# Patient Record
Sex: MALE | Race: WHITE
[De-identification: names, ages, dates, MRNs, and addresses within clinical notes are randomized per-mention and may not be internally consistent; named-entity substitution may affect disease eponyms.]

---

## 2017-04-17 ENCOUNTER — HOSPITAL ENCOUNTER (OUTPATIENT)
Dept: HOSPITAL 47 - RADCTMAIN | Age: 52
Discharge: HOME | End: 2017-04-17
Attending: NURSE PRACTITIONER
Payer: COMMERCIAL

## 2017-04-17 DIAGNOSIS — K80.20: ICD-10-CM

## 2017-04-17 DIAGNOSIS — K66.8: ICD-10-CM

## 2017-04-17 DIAGNOSIS — Z01.818: Primary | ICD-10-CM

## 2017-04-17 PROCEDURE — 74176 CT ABD & PELVIS W/O CONTRAST: CPT

## 2017-04-17 NOTE — CT
EXAMINATION TYPE: CT abdomen pelvis wo con

 

DATE OF EXAM: 4/17/2017 9:03 AM

 

COMPARISON: 2/26/2016

 

INDICATION: Screening for third kidney transplant

 

DLP: 1090 mGycm, Automated exposure control for dose reduction was used.

 

CONTRAST:  0 mL of Omnipaque 300. 

                        Study performed with Oral Contrast

 

TECHNIQUE: Axial images were obtained from above the diaphragm to the pubic rami in the axial plane a
t 5 mm thick sections.  Reconstructed images are reviewed on the computer in the coronal plane.  

 

FINDINGS:

 

Limited CT sections are obtained the lung bases.  There is pneumonitis type changes at the left lung 
base. This is a change from comparison..  Previous pleural effusions have resolved.

 

CT ABDOMEN:

 

Liver: There is a 1.2 x 3.0 cm calcified mass anterior to the right lobe of liver. This was present p
reviously and appears stable. No suspicious intraparenchymal abnormalities evident.

 

Spleen: Spleen appears enlarged measuring 17.7 cm in craniocaudal dimension.

 

Pancreas: Normal

 

Adrenal glands: The adrenal glands are normal.

 

Gallbladder: Punctate gallstone is at the neck of the gallbladder.  

 

Kidneys: Left kidney is atrophic. Right kidney is absent. There is a transplant kidney within the lef
t hemipelvis. This contains a 1.4 cm cyst measuring approximately 16 Hounsfield units. 0.4 cm calcifi
cation is within the hilum. 0.2 and 0.3 cm calcification is within the mid transplant kidney. These c
ould be related to vascular calcifications or nonobstructing renal stones. Reconstructed coronal plan
e images these appear more related to vascular calcification. Some additional vascular calcification 
is at the splenic hilum. Perinephric stranding is present.

 

Aorta: Vascular calcification is within the aorta.  Vascular calcifications within the iliac vessels.


 

Inferior vena cava: Normal.

 

CT PELVIS: 

Sigmoid colon is somewhat thickened within its proximal portion. Correlate for colitis. Couple of div
erticuli within the distal sigmoid colon. Small bowel loops distended with oral contrast are normal. 
Some mild wall thickening of the proximal ascending colon may be present. Transverse colon with contr
ast appears unremarkable.

 

There is a large cystic structure within the mesentery measuring 10.0 x 10.7 centimeters. This was pr
esent previously and may be minimally diminished in size. Previous ascites has essentially resolved .
 There are loops of bowel which are incompletely distended or lack oral contrast limiting their evalu
ation.

 

Appendix: Not identified

 

Urinary bladder: Decompressed with thickened wall can related to incomplete distention. 

 

Genitourinary structures: Prostate appears within normal limits.

 

Osseous structures: No suspicious lytic or sclerotic lesions. Spondylolysis of bowel for may be prese
nt. Facet degenerative changes are present L4-5 L5-S1. Degenerative disc changes are present L3-4.

 

IMPRESSIONS:

1.  Transplant kidney left hemipelvis with perinephric stranding. Vascular calcification appears to b
e present without hydronephrosis.

2. Mesenteric cyst.

3. Resolution of previous ascites and pleural effusions.

4. Correlate for sigmoid colitis proximal portion of the sigmoid colon.

5. Punctate gallstone

## 2017-04-26 ENCOUNTER — HOSPITAL ENCOUNTER (OUTPATIENT)
Dept: HOSPITAL 47 - ORWHC2ENDO | Age: 52
Discharge: HOME | End: 2017-04-26
Payer: MEDICARE

## 2017-04-26 VITALS — DIASTOLIC BLOOD PRESSURE: 53 MMHG | HEART RATE: 73 BPM | SYSTOLIC BLOOD PRESSURE: 91 MMHG

## 2017-04-26 VITALS — RESPIRATION RATE: 16 BRPM

## 2017-04-26 VITALS — BODY MASS INDEX: 25 KG/M2

## 2017-04-26 VITALS — TEMPERATURE: 97.3 F

## 2017-04-26 DIAGNOSIS — Z87.891: ICD-10-CM

## 2017-04-26 DIAGNOSIS — Z01.818: Primary | ICD-10-CM

## 2017-04-26 DIAGNOSIS — K57.30: ICD-10-CM

## 2017-04-26 DIAGNOSIS — Z79.52: ICD-10-CM

## 2017-04-26 DIAGNOSIS — K21.9: ICD-10-CM

## 2017-04-26 DIAGNOSIS — Z79.4: ICD-10-CM

## 2017-04-26 DIAGNOSIS — Z79.899: ICD-10-CM

## 2017-04-26 DIAGNOSIS — E11.9: ICD-10-CM

## 2017-04-26 DIAGNOSIS — D12.4: ICD-10-CM

## 2017-04-26 DIAGNOSIS — K63.89: ICD-10-CM

## 2017-04-26 DIAGNOSIS — N28.9: ICD-10-CM

## 2017-04-26 LAB
GLUCOSE BLD-MCNC: 134 MG/DL (ref 75–99)
GLUCOSE BLD-MCNC: 141 MG/DL (ref 75–99)

## 2017-04-26 PROCEDURE — 88305 TISSUE EXAM BY PATHOLOGIST: CPT

## 2017-04-26 PROCEDURE — 45380 COLONOSCOPY AND BIOPSY: CPT

## 2017-04-26 NOTE — P.PCN
Date of Procedure: 04/26/17


Procedure(s) Performed: 


BRIEF HISTORY: Patient is a 51-year-old pleasant white male, scheduled for an 

elective colonoscopy as a part of pretransplant kidney evaluation.  He also has 

been complaining of change in bowel habits.  He is asked coloscopy was done in 

2015 and was noted to have severe medication-induced colitis.  Presently the 

diarrhea has resolved however he has irregular bowel movements.





PROCEDURE PERFORMED: Colonoscopy with biopsy. 





PREOPERATIVE DIAGNOSIS: Change in bowel habits and prekidney transplant 

evaluation. 





IV sedation per Anesthesia. 





PROCEDURE: After informed consent was obtained, the patient, was brought into 

the endoscopy unit. IV sedation was administered by Anesthesia under continuous 

monitoring.  Digital rectal examination was normal. Initially the Olympus CF-

160 flexible video colonoscope was then inserted in the rectum, gradually 

advanced into the cecum without any difficulty. Careful examination was 

performed as the scope was gradually being withdrawn. Ileocecal valve and the 

appendiceal orifice were visualized and appeared normal.  Prep was excellent. 

Mucosa of the cecum, ascending colon, transverse colon, appeared normal.  In 

the descending colon there was a polyp that was removed by biopsy.  The rest of 

the descending colon, sigmoid colon, and rectum appeared normal.  Random 

biopsies were done from the ascending and descending colon to rule out colitis.

  Scattered sigmoid diverticulosis.  Retroflexion was performed in the rectum 

and no lesions were seen. The patient tolerated the procedure well. 





IMPRESSION: 


5 mm ascending colon polyp status post removal by biopsy.


Rest of the colon appeared normal.


Scattered sigmoid diverticulosis





RECOMMENDATIONS:  Findings of this examination were discussed with the patient 

as well as his family.  He was advised to follow with the biopsy results.  He 

can have a repeat colonoscopy in 5 years, based on the biopsy results.

## 2018-10-01 ENCOUNTER — HOSPITAL ENCOUNTER (OUTPATIENT)
Dept: HOSPITAL 47 - RADUSWWP | Age: 53
Discharge: HOME | End: 2018-10-01
Payer: MEDICARE

## 2018-10-01 DIAGNOSIS — T86.19: Primary | ICD-10-CM

## 2018-10-01 PROCEDURE — 76770 US EXAM ABDO BACK WALL COMP: CPT

## 2018-10-01 NOTE — US
EXAMINATION TYPE: US kidneys/renal and bladder

 

DATE OF EXAM: 10/1/2018

 

COMPARISON: CT 2017, US 2016

 

CLINICAL HISTORY: Hematuria R31.0. Hematuria, native right left kidneys removed, transplant kidney le
ft pelvis, patient states he does not make urine, patient on dialysis.

 

EXAM MEASUREMENTS:

 

Right Kidney:  removed 

Left Kidney: removed 

Transplant Kidney: 9.2 x 7.6 x 7.9cm

 

 

 

Right Kidney: removed  

Left Kidney: removed 

Transplant Kidney: left pelvis, 1.8cm cystic area superior pole

Bladder: not distended

**Bilateral Jets seen: no

 

**Spleen: enlarged at 17.3cm

**10.3 x 7.9 x 10.3cm cystic structure with internal echoes seen in LLQ medial to transplant kidney.

 

 

 

 

 

IMPRESSION:

 

1. Transplanted kidney appears to be unremarkable. Cystic structure is noted upper pole.

2. Cystic structure noted medial to the transplanted kidney with dimensions given above.

3. Splenomegaly.

## 2024-09-24 ENCOUNTER — HOSPITAL ENCOUNTER (INPATIENT)
Dept: HOSPITAL 47 - EC | Age: 59
LOS: 5 days | Discharge: HOME | DRG: 388 | End: 2024-09-29
Attending: INTERNAL MEDICINE | Admitting: INTERNAL MEDICINE
Payer: MEDICARE

## 2024-09-24 DIAGNOSIS — I08.0: ICD-10-CM

## 2024-09-24 DIAGNOSIS — B19.10: ICD-10-CM

## 2024-09-24 DIAGNOSIS — E11.40: ICD-10-CM

## 2024-09-24 DIAGNOSIS — Z99.2: ICD-10-CM

## 2024-09-24 DIAGNOSIS — Z79.51: ICD-10-CM

## 2024-09-24 DIAGNOSIS — I47.10: ICD-10-CM

## 2024-09-24 DIAGNOSIS — I72.3: ICD-10-CM

## 2024-09-24 DIAGNOSIS — Z79.899: ICD-10-CM

## 2024-09-24 DIAGNOSIS — Z95.5: ICD-10-CM

## 2024-09-24 DIAGNOSIS — Z95.820: ICD-10-CM

## 2024-09-24 DIAGNOSIS — J91.8: ICD-10-CM

## 2024-09-24 DIAGNOSIS — I95.89: ICD-10-CM

## 2024-09-24 DIAGNOSIS — E11.22: ICD-10-CM

## 2024-09-24 DIAGNOSIS — K56.7: ICD-10-CM

## 2024-09-24 DIAGNOSIS — N18.6: ICD-10-CM

## 2024-09-24 DIAGNOSIS — Z79.82: ICD-10-CM

## 2024-09-24 DIAGNOSIS — J44.9: ICD-10-CM

## 2024-09-24 DIAGNOSIS — T86.12: ICD-10-CM

## 2024-09-24 DIAGNOSIS — E78.5: ICD-10-CM

## 2024-09-24 DIAGNOSIS — K21.9: ICD-10-CM

## 2024-09-24 DIAGNOSIS — E11.51: ICD-10-CM

## 2024-09-24 DIAGNOSIS — M89.8X9: ICD-10-CM

## 2024-09-24 DIAGNOSIS — K56.609: Primary | ICD-10-CM

## 2024-09-24 DIAGNOSIS — Z87.891: ICD-10-CM

## 2024-09-24 DIAGNOSIS — I12.0: ICD-10-CM

## 2024-09-24 DIAGNOSIS — I25.10: ICD-10-CM

## 2024-09-24 DIAGNOSIS — E87.5: ICD-10-CM

## 2024-09-24 DIAGNOSIS — Z90.5: ICD-10-CM

## 2024-09-24 LAB
ALBUMIN SERPL-MCNC: 5.1 G/DL (ref 3.5–5)
ALP SERPL-CCNC: 121 U/L (ref 38–126)
ALT SERPL-CCNC: 14 U/L (ref 4–49)
AMYLASE SERPL-CCNC: 78 U/L (ref 30–110)
ANION GAP SERPL CALC-SCNC: 16 MMOL/L
AST SERPL-CCNC: 19 U/L (ref 17–59)
BASOPHILS # BLD AUTO: 0 K/UL (ref 0–0.2)
BASOPHILS NFR BLD AUTO: 0 %
BUN SERPL-SCNC: 37 MG/DL (ref 9–20)
CALCIUM SPEC-MCNC: 10.7 MG/DL (ref 8.4–10.2)
CHLORIDE SERPL-SCNC: 89 MMOL/L (ref 98–107)
CO2 SERPL-SCNC: 33 MMOL/L (ref 22–30)
EOSINOPHIL # BLD AUTO: 0.1 K/UL (ref 0–0.7)
EOSINOPHIL NFR BLD AUTO: 1 %
ERYTHROCYTE [DISTWIDTH] IN BLOOD BY AUTOMATED COUNT: 4.82 M/UL (ref 4.3–5.9)
ERYTHROCYTE [DISTWIDTH] IN BLOOD: 14.5 % (ref 11.5–15.5)
GLUCOSE SERPL-MCNC: 180 MG/DL (ref 74–99)
HCT VFR BLD AUTO: 45.1 % (ref 39–53)
HGB BLD-MCNC: 14.5 GM/DL (ref 13–17.5)
LIPASE SERPL-CCNC: 187 U/L (ref 23–300)
LYMPHOCYTES # SPEC AUTO: 0.5 K/UL (ref 1–4.8)
LYMPHOCYTES NFR SPEC AUTO: 9 %
MCH RBC QN AUTO: 30.2 PG (ref 25–35)
MCHC RBC AUTO-ENTMCNC: 32.2 G/DL (ref 31–37)
MCV RBC AUTO: 93.6 FL (ref 80–100)
MONOCYTES # BLD AUTO: 0.3 K/UL (ref 0–1)
MONOCYTES NFR BLD AUTO: 5 %
NEUTROPHILS # BLD AUTO: 4.3 K/UL (ref 1.3–7.7)
NEUTROPHILS NFR BLD AUTO: 83 %
PLATELET # BLD AUTO: 203 K/UL (ref 150–450)
POTASSIUM SERPL-SCNC: 5.7 MMOL/L (ref 3.5–5.1)
PROT SERPL-MCNC: 8.8 G/DL (ref 6.3–8.2)
SODIUM SERPL-SCNC: 138 MMOL/L (ref 137–145)
WBC # BLD AUTO: 5.2 K/UL (ref 3.8–10.6)

## 2024-09-24 PROCEDURE — 83735 ASSAY OF MAGNESIUM: CPT

## 2024-09-24 PROCEDURE — 74250 X-RAY XM SM INT 1CNTRST STD: CPT

## 2024-09-24 PROCEDURE — 84132 ASSAY OF SERUM POTASSIUM: CPT

## 2024-09-24 PROCEDURE — 80053 COMPREHEN METABOLIC PANEL: CPT

## 2024-09-24 PROCEDURE — 74019 RADEX ABDOMEN 2 VIEWS: CPT

## 2024-09-24 PROCEDURE — 82150 ASSAY OF AMYLASE: CPT

## 2024-09-24 PROCEDURE — 36415 COLL VENOUS BLD VENIPUNCTURE: CPT

## 2024-09-24 PROCEDURE — 80048 BASIC METABOLIC PNL TOTAL CA: CPT

## 2024-09-24 PROCEDURE — 94640 AIRWAY INHALATION TREATMENT: CPT

## 2024-09-24 PROCEDURE — 96361 HYDRATE IV INFUSION ADD-ON: CPT

## 2024-09-24 PROCEDURE — 96374 THER/PROPH/DIAG INJ IV PUSH: CPT

## 2024-09-24 PROCEDURE — 85025 COMPLETE CBC W/AUTO DIFF WBC: CPT

## 2024-09-24 PROCEDURE — 90935 HEMODIALYSIS ONE EVALUATION: CPT

## 2024-09-24 PROCEDURE — 99285 EMERGENCY DEPT VISIT HI MDM: CPT

## 2024-09-24 PROCEDURE — 96375 TX/PRO/DX INJ NEW DRUG ADDON: CPT

## 2024-09-24 PROCEDURE — 83605 ASSAY OF LACTIC ACID: CPT

## 2024-09-24 PROCEDURE — 83690 ASSAY OF LIPASE: CPT

## 2024-09-24 PROCEDURE — 74176 CT ABD & PELVIS W/O CONTRAST: CPT

## 2024-09-24 PROCEDURE — 93306 TTE W/DOPPLER COMPLETE: CPT

## 2024-09-24 RX ADMIN — MORPHINE SULFATE STA MG: 4 INJECTION, SOLUTION INTRAMUSCULAR; INTRAVENOUS at 16:45

## 2024-09-24 RX ADMIN — ACETAMINOPHEN PRN MG: 325 TABLET, FILM COATED ORAL at 21:09

## 2024-09-24 RX ADMIN — NICARDIPINE HYDROCHLORIDE STA MLS/HR: 2.5 INJECTION INTRAVENOUS at 18:31

## 2024-09-24 RX ADMIN — PRAVASTATIN SODIUM SCH MG: 40 TABLET ORAL at 21:09

## 2024-09-24 RX ADMIN — ISODIUM CHLORIDE SCH MG: 0.03 SOLUTION RESPIRATORY (INHALATION) at 20:27

## 2024-09-24 RX ADMIN — CEFAZOLIN STA MLS/HR: 330 INJECTION, POWDER, FOR SOLUTION INTRAMUSCULAR; INTRAVENOUS at 15:43

## 2024-09-24 RX ADMIN — METOCLOPRAMIDE STA MG: 5 INJECTION, SOLUTION INTRAMUSCULAR; INTRAVENOUS at 18:32

## 2024-09-24 NOTE — CT
EXAMINATION TYPE: CT abdomen pelvis wo con

 

DATE OF EXAM: 9/24/2024

 

COMPARISON: 4/17/20172

 

INDICATION: ab pain, nausea and vomiting

 

DLP: 569.5 mGycm, Automated exposure control for dose reduction was used.

 

CONTRAST:  0 mL of Isovue 300. 

                        Study performed without Oral Contrast

 

TECHNIQUE: Axial images were obtained from above the diaphragm to the pubic rami in the axial plane a
t 5 mm thick sections.  Reconstructed images are reviewed on the computer in the coronal plane.  

 

FINDINGS:

 

Limited CT sections are obtained the lung bases.  There is a loculated effusion in the posterior late
ral left lung base. This has adjacent pleural calcification. This is incompletely evaluated. Minimal 
atelectasis posterior right lung base.

 

.

 

CT ABDOMEN: There is calcification lateral to the right hepatic border. This was present previously a
nd currently measures 3.4 x 2.2 cm. Previous measurement 3.0 x 1.2 cm. There is a calcified collectio
n in the mid abdomen

 

Liver: Normal

 

Spleen: Normal

 

Pancreas: Normal

 

Adrenal glands: The adrenal glands are normal.

 

Gallbladder: Normal  

 

Kidneys: Left kidney severely atrophic. Right kidney not identified. There is a left hemipelvis pancr
eas is may be a transplant kidney. There are multiple nonobstructing renal stones within this transpl
anted kidney. No hydronephrosis is evident.. 

 

Aorta: Vascular calcification is within the aorta.  There is some fusiform prominence of ther proxima
l common right iliac artery measuring 3.3 cm in diameter.

 

Inferior vena cava: Normal.

 

CT PELVIS: Femorofemoral bypass graft is evident. There is a fluid collection in the left inguinal re
gion which is adjacent to the femoral bypass. Hematoma or seroma could be considered. Correlate for i
nfection. Abscess is considered less likely. Racquetball: Scattered diverticuli to the sigmoid colon.
 Small bowel loops within the midabdomen have some fecal debris. These loops are also dilated and has
 some fluid present. The obstruction however is not identified.

 

Appendix: Not identified. No dilated tubular structure or inflammatory changes are evident.

 

Urinary bladder: Decompressed and cannot be evaluated. 

 

Genitourinary structures: Minimal prominence of the prostate

 

Osseous structures: No suspicious lytic or sclerotic lesions. Degenerative changes within the lumbar 
spine.

 

IMPRESSION:

1.  There are several collections with calcified walls including a fluid collection within the left l
kaushik base, intermediate calcified rim collection within the mid pelvis. A calcified collection lateral
 to the liver which was present previously. There appears to be progression of the wall calcification
s.

2. Suspected transplant kidney without hydronephrosis left hemipelvis. Multiple nonobstructing renal 
stones are present.

3. Nonspecific prominent small bowel loops with fluid and some fecal debris. Correlate for ileus and 
partial small bowel obstruction. Zone of transition is not identified.

 

[facility], Workstation: [id number], 9/24/2024 5:50 PM

## 2024-09-24 NOTE — ED
Nausea/Vomiting/Diarrhea HPI





- General


Source: patient, RN notes reviewed


Mode of arrival: ambulatory


Limitations: no limitations





<Latanya Nicolas - Last Filed: 09/24/24 13:32>





<Holli Latham - Last Filed: 09/24/24 20:04>





- General


Stated complaint: Abdominal Pain/Vomiting


Time Seen by Provider: 09/24/24 13:32





- History of Present Illness


Initial comments: 


Quick note: 59-year-old male presented to the ER with a chief complaint of 

nausea and vomiting.  Patient has kidney disease and attends dialysis Monday Wednesday Friday.  He states he was not feeling well yesterday and had his last 

treatment today.  He also is reporting abdominal pain.  He has been having the 

symptoms for the past 2 to 3 days.  Denies any fevers, chills, diarrhea or 

history of bowel surgeries.  Patient was in urgent care yesterday and received 

Zofran. (Latanya Nicolas)


59-year-old male with a history of end-stage renal disease on hemodialysis 

Monday, Wednesday, Friday presents emergency department chief complaint of 

nausea and vomiting and abdominal pain.  Last hemodialysis treatment was today. 

He states his symptoms have been present for the past 2 to 3 days.  He denies 

fevers, chills, hematochezia, dark or tarry stools.  Patient states he does not 

produce urine.  Patient was evaluated by urgent care yesterday and was 

prescribed Zofran and discharged home. (Holli Latham)





- Related Data


                                Home Medications











 Medication  Instructions  Recorded  Confirmed


 


Aspirin 81 mg PO DAILY 04/24/17 09/24/24


 


Omeprazole 20 mg PO DAILY 04/24/17 09/24/24


 


Albuterol Inhaler [Ventolin Hfa 2 puff INHALATION RT-Q4H PRN 09/24/24 09/24/24





Inhaler]   


 


Albuterol Nebulized [Ventolin 2.5 mg INHALATION RT-QID 09/24/24 09/24/24





Nebulized]   


 


Calcium Acetate 668mg 2,004 mg PO TID-W/MEALS 09/24/24 09/24/24


 


Cinacalcet HCl [Sensipar] 60 mg PO W/SUPPER 09/24/24 09/24/24


 


Ondansetron Odt [Zofran Odt] 4 mg PO TID PRN 09/24/24 09/24/24


 


Pravastatin Sodium [Pravachol] 40 mg PO HS 09/24/24 09/24/24


 


Tenofovir Disoproxil Fumarate 300 mg PO FR 09/24/24 09/24/24





[Viread]   


 


Tiotropium 2.5 Mcg/Puff [Spiriva 2 puff INHALATION RT-DAILY 09/24/24 09/24/24





Respimat 2.5 Mcg]   


 


Vit B Comp No.3/Folic/C/Biotin 1 tab PO DAILY 09/24/24 09/24/24





[Bhumika-Rene Rx Tablet]   


 


clonazePAM [KlonoPIN ODT] 0.25 mg PO HS PRN 09/24/24 09/24/24











                                    Allergies











Allergy/AdvReac Type Severity Reaction Status Date / Time


 


No Known Allergies Allergy   Verified 09/24/24 15:39














Review of Systems


ROS Other: All systems not noted in ROS Statement are negative.





<Latanya Nicolas - Last Filed: 09/24/24 13:32>


ROS Other: All systems not noted in ROS Statement are negative.





<Holli Latham - Last Filed: 09/24/24 20:04>


ROS Statement: 


Those systems with pertinent positive or pertinent negative responses have been 

documented in the HPI.








Past Medical History


Past Medical History: Diabetes Mellitus, GERD/Reflux, Hyperlipidemia, 

Hypertension, Renal Disease


Additional Past Medical History / Comment(s): lt kidney transplant- timur 

neuropathy,  hepatitis b from hemodialysis. KIDNEY TRANSPLANT X3 . HEMO  

DYALYSIS TU THUR SAT


History of Any Multi-Drug Resistant Organisms: None Reported


Past Surgical History: Hernia Repair, Tonsillectomy


Additional Past Surgical History / Comment(s): colonoscopy, crystal nephrectomy , 

then transplanted lt kidney last 2003, abd hernia repair(4);


Past Anesthesia/Blood Transfusion Reactions: No Reported Reaction


Past Psychological History: No Psychological Hx Reported


Past Alcohol Use History: None Reported


Additional Past Alcohol Use History / Comment(s): STARTED SMOKING AT AGE 12    

QUIT SMOKING IN 2004   SMOKED 2PPD


Past Drug Use History: None Reported





- Past Family History


  ** Mother


Family Medical History: No Reported History





  ** Father


Additional Family Medical History / Comment(s): nerve conditon





<Latanya Nicolas - Last Filed: 09/24/24 13:32>





General Exam





<Latanya Nicolas - Last Filed: 09/24/24 13:32>


General appearance: alert, in no apparent distress


Eye exam: Present: normal appearance, PERRL, EOMI.  Absent: scleral icterus, 

conjunctival injection, periorbital swelling


ENT exam: Present: normal exam, mucous membranes moist


Neck exam: Present: normal inspection.  Absent: tenderness, meningismus, 

lymphadenopathy


Respiratory exam: Present: normal lung sounds bilaterally.  Absent: respiratory 

distress, wheezes, rales, rhonchi, stridor


Cardiovascular Exam: Present: regular rate, normal rhythm, normal heart sounds. 

Absent: systolic murmur, diastolic murmur, rubs, gallop, clicks


GI/Abdominal exam: Present: soft, tenderness (epigastric), normal bowel sounds, 

mass (epigastric), other (multiple post-surgical incision sites).  Absent: 

distended, guarding, rebound, rigid


Extremities exam: Present: normal inspection, full ROM, normal capillary refill.

 Absent: tenderness, pedal edema, joint swelling, calf tenderness


Back exam: Present: normal inspection


Skin exam: Present: warm, dry, intact, normal color.  Absent: rash





<Holli Latham - Last Filed: 09/24/24 20:04>





- General Exam Comments


Initial Comments: 





Visual Physical Exam





Vital signs reviewed





General: Well-appearing, nontoxic, no acute distress.


Head: Normocephalic, atraumatic


Eyes: PERRLA, EOMI


ENT: Airway patent


Chest: Nonlabored breathing


Skin: No visual rash, normal skin tone


Neuro: Alert and oriented 3


Musculoskeletal: No gross abnormalities


 (Latanya Nicolas)





Course


                                   Vital Signs











  09/24/24 09/24/24





  13:39 17:52


 


Temperature 97.8 F 


 


Pulse Rate 79 90


 


Respiratory 18 18





Rate  


 


Blood Pressure 135/70 127/60


 


O2 Sat by Pulse 100 95





Oximetry  














Medical Decision Making





<Latanya Nicolas - Last Filed: 09/24/24 13:32>





- Lab Data


Result diagrams: 


                                 09/24/24 15:18





                                 09/24/24 15:18





<Holli Latham - Last Filed: 09/24/24 20:04>





- Medical Decision Making





I performed the quick note portion of this chart.  Electronically signed by  

Latanya Nicolas PA-C (Latanya Nicolas)


Was pt. sent in by a medical professional or institution ([facility], NP, urgent 

care, hospital, or nursing home...) When possible be specific


@ -No


Did you speak to anyone other than the patient for history (EMS, parent, family,

police, friend...)? What history was obtained from this source 


@ -No


Did you review nursing and triage notes (agree or disagree)? Why? 


@ -I reviewed and agree with nursing and triage notes


Were old charts reviewed (outside hosp., previous admission, EMS record, old 

EKG, old radiological studies, urgent care reports/EKG's, nursing home records)?

Report findings 


@ -No old charts were reviewed


Differential Diagnosis (chest pain, altered mental status, abdominal pain women,

abdominal pain men, vaginal bleeding, weakness, fever, dyspnea, syncope, 

headache, dizziness, GI bleed, back pain, seizure, CVA, palpatations, mental 

health, musculoskeletal)? 


@ -Differential Abdominal Pain Men:


Appendicitis, cholecystitis, diverticulosis, ischemic bowel, pancreatitis, 

hepatitis, UTI, gastroenteritis, AAA, incarcerated hernia, bowel obstruction, 

constipation, inflammatory bowel, hepatitis, peptic ulcer disease, splenic 

infarction, perforated viscus, testicular torsion, this is not meant to be an 

all-inclusive list





EKG interpreted by me (3pts min.).


@ -None


X-rays interpreted by me (1pt min.).


@ -None done


CT interpreted by me (1pt min.).


@ -CT of the abdomen pelvis without IV contrast reveals calcified walls with a 

fluid collection within the left lung base regression of wall calcifications 

with kidney transplant without hydronephrosis and nonspecific small bowel loops,

possible ileus and partial small bowel obstruction


U/S interpreted by me (1pt. min.).


@ -None done


What testing was considered but not performed or refused? (CT, X-rays, U/S, 

labs)? Why?


@ -None


What meds were considered but not given or refused? Why?


@ -None


Did you discuss the management of the patient with other professionals 

(professionals i.e. [facility], NP, lab, RT, psych nurse, , , 

teacher, , )? Give summary


@ -Spoke with nurse practitioner with [facility], Cheyanne Pitts, in regard to the patient's presentation and CT scan concerning for small

bowel obstruction versus an ileus.  Patient will be admitted to internal 

medicine with nephrology on consult and general surgery


Was smoking cessation discussed for >3mins.?


@ -No


Was critical care preformed (if so, how long)?


@ -No


Were there social determinants of health that impacted care today? How? 

(Homelessness, low income, unemployed, alcoholism, drug addiction, 

transportation, low edu. Level, literacy, decrease access to med. care, nursing home, 

rehab)?


@ -No


Was there de-escalation of care discussed even if they declined (Discuss DNR or 

withdrawal of care, Hospice)? DNR status


@ -No


What co-morbidities impacted this encounter? (DM, HTN, Smoking, COPD, CAD, 

Cancer, CVA, ARF, Chemo, Hep., AIDS, mental health diagnosis, sleep apnea, 

morbid obesity)?


@ -None


Was patient admitted / discharged? Hospital course, mention meds given and 

route, prescriptions, significant lab abnormalities, going to OR and other 

pertinent info.


@ -Admitted.  59-year-old male with abdominal pain, nausea and vomiting.  

Patient was originally evaluated as a quick note where laboratory studies were o

rdered.  On my evaluation of the patient he is resting comfortably no signs of 

acute distress.  Abdominal examination reveals epigastric tenderness and 

palpable mass.  Patient states that mass of his abdomen has been present over 

the past 10 to 12 years.  He is symptomatically treated with antiemetics and 

pain medication pending CT imaging results.  He is agree with this plan. CBC 

unremarkable, CMP feels hyperkalemia of 5.7, acidosis with a chloride of 89 and 

CO2 of 33, elevated BUN at 37 creatinine 7.16 consistent with patient's chronic 

kidney disease, amylase and lipase within normal limits.  CT concerning for 

nonspecific small bowel loops with ileus or partial small bowel obstruction.  

Patient will be admitted to internal medicine with nephrology and general 

surgery on consult and will continue with liquid diet.  Additionally he is 

provided with a 500 mL fluid bolus due to elevated serum creatinine and 

electrolyte imbalance and will not be continued on maintenance therapy due to 

patient being on end-stage renal disease and would not like to fluid over the 

patient.  Discussed with my attending Dr. Elaine.


Undiagnosed new problem with uncertain prognosis?


@ -No


Drug Therapy requiring intensive monitoring for toxicity (Heparin, Nitro, 

Insulin, Cardizem)?


@ -No


Were any procedures done?


@ -No


Diagnosis/symptom?


@ -Small bowel obstruction versus ileus, end-stage renal disease on hemodialysis


Acute, or Chronic, or Acute on Chronic?


@ -acute


Uncomplicated (without systemic symptoms) or Complicated (systemic symptoms)?


@ -Complicated


Side effects of treatment?


@ -No


Exacerbation, Progression, or Severe Exacerbation?


@ -No


Poses a threat to life or bodily function? How? (Chest pain, USA, MI, pneumonia,

PE, COPD, DKA, ARF, appy, cholecystitis, CVA, Diverticulitis, Homicidal, 

Suicidal, threat to staff... and all critical care pts)


@ -No (Holli Latham)





- Lab Data


                                   Lab Results











  09/24/24 09/24/24 09/24/24 Range/Units





  15:18 15:18 15:18 


 


WBC  5.2    (3.8-10.6)  k/uL


 


RBC  4.82    (4.30-5.90)  m/uL


 


Hgb  14.5    (13.0-17.5)  gm/dL


 


Hct  45.1    (39.0-53.0)  %


 


MCV  93.6    (80.0-100.0)  fL


 


MCH  30.2    (25.0-35.0)  pg


 


MCHC  32.2    (31.0-37.0)  g/dL


 


RDW  14.5    (11.5-15.5)  %


 


Plt Count  203    (150-450)  k/uL


 


MPV  7.2    


 


Neutrophils %  83    %


 


Lymphocytes %  9    %


 


Monocytes %  5    %


 


Eosinophils %  1    %


 


Basophils %  0    %


 


Neutrophils #  4.3    (1.3-7.7)  k/uL


 


Lymphocytes #  0.5 L    (1.0-4.8)  k/uL


 


Monocytes #  0.3    (0-1.0)  k/uL


 


Eosinophils #  0.1    (0-0.7)  k/uL


 


Basophils #  0.0    (0-0.2)  k/uL


 


Sodium   138   (137-145)  mmol/L


 


Potassium   5.7 H   (3.5-5.1)  mmol/L


 


Chloride   89 L   ()  mmol/L


 


Carbon Dioxide   33 H   (22-30)  mmol/L


 


Anion Gap   16   mmol/L


 


BUN   37 H   (9-20)  mg/dL


 


Creatinine   7.16 H*   (0.66-1.25)  mg/dL


 


Est GFR (CKD-EPI)AfAm   9   (>60 ml/min/1.73 sqM)  


 


Est GFR (CKD-EPI)NonAf   7   (>60 ml/min/1.73 sqM)  


 


Glucose   180 H   (74-99)  mg/dL


 


Plasma Lactic Acid Facundo    1.8  (0.7-2.0)  mmol/L


 


Calcium   10.7 H   (8.4-10.2)  mg/dL


 


Total Bilirubin   1.4 H   (0.2-1.3)  mg/dL


 


AST   19   (17-59)  U/L


 


ALT   14   (4-49)  U/L


 


Alkaline Phosphatase   121   ()  U/L


 


Total Protein   8.8 H   (6.3-8.2)  g/dL


 


Albumin   5.1 H   (3.5-5.0)  g/dL


 


Amylase   78   ()  U/L


 


Lipase   187   ()  U/L














Disposition





<Latanya Nicolas - Last Filed: 09/24/24 13:32>


Is patient prescribed a controlled substance at d/c from ED?: No


Decision to Admit Reason: Admit from EC


Decision Date: 09/24/24


Decision Time: 18:21





<Holli Latham - Last Filed: 09/24/24 20:04>


Clinical Impression: 


 Abdominal pain, Small bowel obstruction





Disposition: ADMITTED AS IP TO THIS [facility]


Condition: Stable

## 2024-09-25 LAB
ALBUMIN SERPL-MCNC: 3.9 G/DL (ref 3.8–4.9)
ALBUMIN SERPL-MCNC: 4 G/DL (ref 3.5–5)
ALBUMIN/GLOB SERPL: 1.56 RATIO (ref 1.6–3.17)
ALP SERPL-CCNC: 86 U/L (ref 38–126)
ALP SERPL-CCNC: 99 U/L (ref 41–126)
ALT SERPL-CCNC: 10 U/L (ref 8–49)
ALT SERPL-CCNC: 12 U/L (ref 4–49)
ANION GAP SERPL CALC-SCNC: 18 MMOL/L
ANION GAP SERPL CALC-SCNC: 19 MMOL/L
ANION GAP SERPL CALC-SCNC: 19.7 MMOL/L (ref 4–12)
AST SERPL-CCNC: 11 U/L (ref 13–35)
AST SERPL-CCNC: 15 U/L (ref 17–59)
BASOPHILS # BLD AUTO: 0.01 X 10*3/UL (ref 0–0.1)
BASOPHILS NFR BLD AUTO: 0.2 %
BUN SERPL-SCNC: 42 MG/DL (ref 9–20)
BUN SERPL-SCNC: 46.8 MG/DL (ref 9–27)
BUN SERPL-SCNC: 65 MG/DL (ref 9–20)
BUN/CREAT SERPL: 5.92 RATIO (ref 12–20)
CALCIUM SPEC-MCNC: 9.2 MG/DL (ref 8.7–10.3)
CALCIUM SPEC-MCNC: 9.3 MG/DL (ref 8.4–10.2)
CALCIUM SPEC-MCNC: 9.4 MG/DL (ref 8.4–10.2)
CHLORIDE SERPL-SCNC: 88 MMOL/L (ref 98–107)
CHLORIDE SERPL-SCNC: 91 MMOL/L (ref 96–109)
CHLORIDE SERPL-SCNC: 91 MMOL/L (ref 98–107)
CO2 SERPL-SCNC: 26 MMOL/L (ref 22–30)
CO2 SERPL-SCNC: 26.3 MMOL/L (ref 21.6–31.8)
CO2 SERPL-SCNC: 29 MMOL/L (ref 22–30)
EOSINOPHIL # BLD AUTO: 0.01 X 10*3/UL (ref 0.04–0.35)
EOSINOPHIL NFR BLD AUTO: 0.2 %
ERYTHROCYTE [DISTWIDTH] IN BLOOD BY AUTOMATED COUNT: 3.85 X 10*6/UL (ref 4.1–5.6)
ERYTHROCYTE [DISTWIDTH] IN BLOOD: 14.7 % (ref 11.5–14.5)
GLOBULIN SER CALC-MCNC: 2.5 G/DL (ref 1.6–3.3)
GLUCOSE SERPL-MCNC: 104 MG/DL (ref 74–99)
GLUCOSE SERPL-MCNC: 126 MG/DL (ref 70–110)
GLUCOSE SERPL-MCNC: 153 MG/DL (ref 74–99)
HCT VFR BLD AUTO: 35.9 % (ref 37.2–50)
HGB BLD-MCNC: 11.5 G/DL (ref 12–17)
IMM GRANULOCYTES BLD QL AUTO: 0.5 %
LYMPHOCYTES # SPEC AUTO: 0.56 X 10*3/UL (ref 0.9–5)
LYMPHOCYTES NFR SPEC AUTO: 9 %
MCH RBC QN AUTO: 29.9 PG (ref 27–32)
MCHC RBC AUTO-ENTMCNC: 32 G/DL (ref 32–37)
MCV RBC AUTO: 93.2 FL (ref 80–97)
MONOCYTES # BLD AUTO: 0.73 X 10*3/UL (ref 0.2–1)
MONOCYTES NFR BLD AUTO: 11.7 %
NEUTROPHILS # BLD AUTO: 4.89 X 10*3/UL (ref 1.8–7.7)
NEUTROPHILS NFR BLD AUTO: 78.4 %
NRBC BLD AUTO-RTO: 0 X 10*3/UL (ref 0–0.01)
PLATELET # BLD AUTO: 166 X 10*3/UL (ref 140–440)
POTASSIUM SERPL-SCNC: 4.8 MMOL/L (ref 3.5–5.1)
POTASSIUM SERPL-SCNC: 5.7 MMOL/L (ref 3.5–5.5)
POTASSIUM SERPL-SCNC: 5.8 MMOL/L (ref 3.5–5.1)
PROT SERPL-MCNC: 6.4 G/DL (ref 6.2–8.2)
PROT SERPL-MCNC: 6.7 G/DL (ref 6.3–8.2)
SODIUM SERPL-SCNC: 135 MMOL/L (ref 137–145)
SODIUM SERPL-SCNC: 136 MMOL/L (ref 137–145)
SODIUM SERPL-SCNC: 137 MMOL/L (ref 135–145)
WBC # BLD AUTO: 6.23 X 10*3/UL (ref 4.5–10)

## 2024-09-25 PROCEDURE — 5A1D70Z PERFORMANCE OF URINARY FILTRATION, INTERMITTENT, LESS THAN 6 HOURS PER DAY: ICD-10-PCS

## 2024-09-25 PROCEDURE — 0D9670Z DRAINAGE OF STOMACH WITH DRAINAGE DEVICE, VIA NATURAL OR ARTIFICIAL OPENING: ICD-10-PCS

## 2024-09-25 RX ADMIN — HYDROMORPHONE HYDROCHLORIDE PRN MG: 1 INJECTION, SOLUTION INTRAMUSCULAR; INTRAVENOUS; SUBCUTANEOUS at 14:09

## 2024-09-25 RX ADMIN — PANTOPRAZOLE SODIUM SCH MG: 40 INJECTION, POWDER, FOR SOLUTION INTRAVENOUS at 22:13

## 2024-09-25 RX ADMIN — TIOTROPIUM BROMIDE INHALATION SPRAY SCH PUFF: 3.12 SPRAY, METERED RESPIRATORY (INHALATION) at 13:02

## 2024-09-25 RX ADMIN — CEFAZOLIN ONE MLS/HR: 330 INJECTION, POWDER, FOR SOLUTION INTRAMUSCULAR; INTRAVENOUS at 18:30

## 2024-09-25 RX ADMIN — PANTOPRAZOLE SODIUM SCH MG: 40 TABLET, DELAYED RELEASE ORAL at 06:23

## 2024-09-25 RX ADMIN — MIDODRINE HYDROCHLORIDE STA: 5 TABLET ORAL at 16:19

## 2024-09-25 RX ADMIN — ONDANSETRON PRN MG: 2 INJECTION INTRAMUSCULAR; INTRAVENOUS at 00:25

## 2024-09-25 RX ADMIN — ASPIRIN 81 MG CHEWABLE TABLET SCH MG: 81 TABLET CHEWABLE at 08:29

## 2024-09-25 RX ADMIN — MIDODRINE HYDROCHLORIDE SCH: 5 TABLET ORAL at 16:19

## 2024-09-25 RX ADMIN — HYDROCODONE BITARTRATE AND ACETAMINOPHEN PRN EACH: 5; 325 TABLET ORAL at 01:03

## 2024-09-25 RX ADMIN — DILTIAZEM HYDROCHLORIDE SCH MLS/HR: 5 INJECTION INTRAVENOUS at 23:16

## 2024-09-25 RX ADMIN — HEPARIN SODIUM SCH UNIT: 5000 INJECTION, SOLUTION INTRAVENOUS; SUBCUTANEOUS at 14:09

## 2024-09-25 RX ADMIN — Medication SCH EACH: at 08:29

## 2024-09-25 NOTE — P.NPCON
History of Present Illness





- Reason for Consult


end stage renal disease





- History of Present Illness





Reason for consultation: End-stage renal disease





History of present is:


Patient is a 59-year-old male seen in renal consultation for end-stage renal 

disease.  He is maintained on hemodialysis on Monday Wednesday Friday schedule. 

Patient came to the hospital due to abdominal cramping that began Saturday 

morning.  Patient states he went camping over the weekend and did not feel well.

 Has been having vomiting and not been able to keep much food down.  Patient 

does have history of coronary disease as well as peripheral vascular disease.  

Denies history of diabetes.  Patient makes no urine.  Patient states he missed 

hemodialysis treatment Monday but did have a treatment yesterday.  CT of the 

abdomen and pelvis showed ileus versus partial small bowel obstruction.  Several

calcified walls in the lungs and mid pelvis were noted.  He is being followed by

surgery.  Small bowel follow-through studies pending.





Vital signs are stable.


General: No acute distress.


HEENT: Head exam is unremarkable.  On room air.


LUNGS: No audible rhonchi or wheezes.


HEART: Tachycardic.


ABDOMEN: Generalized tenderness present.


EXTREMITITES: No edema.





Past Medical History


Past Medical History: GERD/Reflux, Hyperlipidemia, Hypertension, Renal Disease


Additional Past Medical History / Comment(s): lt kidney transplant- timur 

neuropathy,  hepatitis b from hemodialysis. KIDNEY TRANSPLANT X3 . HEMO  

DYALYSIS MON WED FRI


History of Any Multi-Drug Resistant Organisms: None Reported


Past Surgical History: Hernia Repair, Tonsillectomy


Additional Past Surgical History / Comment(s): colonoscopy, crystal nephrectomy , 

then transplanted lt kidney last 2003, abd hernia repair(4);


Past Anesthesia/Blood Transfusion Reactions: No Reported Reaction


Past Psychological History: No Psychological Hx Reported


Smoking Status: Former smoker


Past Alcohol Use History: None Reported


Additional Past Alcohol Use History / Comment(s): STARTED SMOKING AT AGE 12    

QUIT SMOKING IN 2004   SMOKED 2PPD


Past Drug Use History: None Reported





- Past Family History


  ** Mother


Family Medical History: No Reported History





  ** Father


Additional Family Medical History / Comment(s): nerve conditon





Medications and Allergies


                                Home Medications











 Medication  Instructions  Recorded  Confirmed  Type


 


Aspirin 81 mg PO DAILY 04/24/17 09/24/24 History


 


Omeprazole 20 mg PO DAILY 04/24/17 09/24/24 History


 


Albuterol Inhaler [Ventolin Hfa 2 puff INHALATION RT-Q4H PRN 09/24/24 09/24/24 

History





Inhaler]    


 


Albuterol Nebulized [Ventolin 2.5 mg INHALATION RT-QID 09/24/24 09/24/24 History





Nebulized]    


 


Calcium Acetate 668mg 2,004 mg PO TID-W/MEALS 09/24/24 09/24/24 History


 


Cinacalcet HCl [Sensipar] 60 mg PO W/SUPPER 09/24/24 09/24/24 History


 


Ondansetron Odt [Zofran Odt] 4 mg PO TID PRN 09/24/24 09/24/24 History


 


Pravastatin Sodium [Pravachol] 40 mg PO HS 09/24/24 09/24/24 History


 


Tenofovir Disoproxil Fumarate 300 mg PO FR 09/24/24 09/24/24 History





[Viread]    


 


Tiotropium 2.5 Mcg/Puff [Spiriva 2 puff INHALATION RT-DAILY 09/24/24 09/24/24 

History





Respimat 2.5 Mcg]    


 


Vit B Comp No.3/Folic/C/Biotin 1 tab PO DAILY 09/24/24 09/24/24 History





[Bhumika-Rene Rx Tablet]    


 


clonazePAM [KlonoPIN ODT] 0.25 mg PO HS PRN 09/24/24 09/24/24 History








                                    Allergies











Allergy/AdvReac Type Severity Reaction Status Date / Time


 


No Known Allergies Allergy   Verified 09/24/24 15:39














Physical Exam


Vitals: 


                                   Vital Signs











  Temp Pulse Pulse Resp BP BP Pulse Ox


 


 09/25/24 13:06   104 H     


 


 09/25/24 12:56   100     


 


 09/25/24 08:02   104 H     


 


 09/25/24 07:51   104 H     


 


 09/25/24 07:13  98.9 F   101 H  18   88/54  90 L


 


 09/25/24 01:01  99.9 F H   107 H  18   106/63  93 L


 


 09/24/24 20:46  98.5 F   107 H  17   122/47  98


 


 09/24/24 20:37   101 H     


 


 09/24/24 20:28   97     


 


 09/24/24 20:17   101 H   18  107/70   95


 


 09/24/24 17:52   90   18  127/60   95


 


 09/24/24 13:39  97.8 F  79   18  135/70   100








                                Intake and Output











 09/24/24 09/25/24 09/25/24





 22:59 06:59 14:59


 


Other:   


 


  Voiding Method Toilet  


 


  Weight 83.007 kg  














Results





- Lab Results


                             Most recent lab results











Calcium  9.2 mg/dL (8.7-10.3)   09/25/24  03:14    














                                 09/25/24 03:14





                                 09/25/24 03:14





Assessment and Plan


Plan: 





Assessment:


1.  End-stage renal disease maintained on hemodialysis on Monday Wednesday Friday schedule.


2.  Abdominal pain due to ileus versus bowel obstruction.  Surgery following.


3.  Coronary disease with cardiac stenting.


4.  Peripheral vascular disease status post surgical invention in the past.  

Patient follows with vascular surgery from [facility].


5.  Chronic kidney disease mineral bone disease maintained on Sensipar and 

PhosLo outpatient.


6.  History of hepatitis B maintained on tenofovir.


7.  Hyperkalemia secondary to chronic kidney disease.





Plan:


Hemodialysis today per his outpatient schedule.


Follow-up small bowel follow-through.


Hold PhosLo and Sensipar at this time.





Thank you for the consultation.  I will continue to follow the patient with you 

during his hospital stay.

## 2024-09-25 NOTE — XR
EXAMINATION TYPE: XR chest 1V confirm line [facility]

 

DATE OF EXAM: 9/25/2024

 

COMPARISON: 7/7/2015

 

INDICATION: Nasogastric tube placement

 

TECHNIQUE: Single frontal view of the chest is obtained.

 

FINDINGS:  

The heart size is normal.  

The pulmonary vasculature is normal.  

There is an infiltrate in the left lower lobe. Small amount left pleural fluid may be present.  

Nasogastric tube transverses the thorax. Tip appears to be within the left upper quadrant p.m. poorly
 visualized due to penetration.

 

IMPRESSION:  

1. Left lower lobe infiltrate with small left pleural effusion. Nasogastric tube transverses the thor
ax.

 

[facility], Workstation: [id number], 9/25/2024 3:00 PM

## 2024-09-25 NOTE — P.GSCN
History of Present Illness


Consult date: 09/25/24


History of present illness: 





59-year-old male presented to the emergency department with complaint of 

abdominal cramping and nausea and vomiting.  He states his last bowel movement 

was about 4 days ago.  He does have history of end-stage renal disease and is 

dialysis dependent.  He does have history of renal transplant as well.  He has 

had multiple abdominal surgeries.  CT of the abdomen and pelvis was performed 

with concerning findings of dilated small bowel and ileus versus partial small 

bowel obstruction.





Review of Systems


All systems: negative





Past Medical History


Past Medical History: GERD/Reflux, Hyperlipidemia, Hypertension, Renal Disease


Additional Past Medical History / Comment(s): lt kidney transplant- timur 

neuropathy,  hepatitis b from hemodialysis. KIDNEY TRANSPLANT X3 . HEMO  

DYALYSIS MON WED FRI


History of Any Multi-Drug Resistant Organisms: None Reported


Past Surgical History: Hernia Repair, Tonsillectomy


Additional Past Surgical History / Comment(s): colonoscopy, crystal nephrectomy , 

then transplanted lt kidney last 2003, abd hernia repair(4);


Past Anesthesia/Blood Transfusion Reactions: No Reported Reaction


Past Psychological History: No Psychological Hx Reported


Smoking Status: Former smoker


Past Alcohol Use History: None Reported


Additional Past Alcohol Use History / Comment(s): STARTED SMOKING AT AGE 12    

QUIT SMOKING IN 2004   SMOKED 2PPD


Past Drug Use History: None Reported





- Past Family History


  ** Mother


Family Medical History: No Reported History





  ** Father


Additional Family Medical History / Comment(s): nerve conditon





Medications and Allergies


                                Home Medications











 Medication  Instructions  Recorded  Confirmed  Type


 


Aspirin 81 mg PO DAILY 04/24/17 09/24/24 History


 


Omeprazole 20 mg PO DAILY 04/24/17 09/24/24 History


 


Albuterol Inhaler [Ventolin Hfa 2 puff INHALATION RT-Q4H PRN 09/24/24 09/24/24 

History





Inhaler]    


 


Albuterol Nebulized [Ventolin 2.5 mg INHALATION RT-QID 09/24/24 09/24/24 History





Nebulized]    


 


Calcium Acetate 668mg 2,004 mg PO TID-W/MEALS 09/24/24 09/24/24 History


 


Cinacalcet HCl [Sensipar] 60 mg PO W/SUPPER 09/24/24 09/24/24 History


 


Ondansetron Odt [Zofran Odt] 4 mg PO TID PRN 09/24/24 09/24/24 History


 


Pravastatin Sodium [Pravachol] 40 mg PO HS 09/24/24 09/24/24 History


 


Tenofovir Disoproxil Fumarate 300 mg PO FR 09/24/24 09/24/24 History





[Viread]    


 


Tiotropium 2.5 Mcg/Puff [Spiriva 2 puff INHALATION RT-DAILY 09/24/24 09/24/24 

History





Respimat 2.5 Mcg]    


 


Vit B Comp No.3/Folic/C/Biotin 1 tab PO DAILY 09/24/24 09/24/24 History





[Bhumika-Rene Rx Tablet]    


 


clonazePAM [KlonoPIN ODT] 0.25 mg PO HS PRN 09/24/24 09/24/24 History








                                    Allergies











Allergy/AdvReac Type Severity Reaction Status Date / Time


 


No Known Allergies Allergy   Verified 09/24/24 15:39














Surgical - Exam


Osteopathic Statement: *.  No significant issues noted on an osteopathic str

uctural exam other than those noted in the History and Physical/Consult.


                                   Vital Signs











Temp Pulse Resp BP Pulse Ox


 


 97.8 F   79   18   135/70   100 


 


 09/24/24 13:39  09/24/24 13:39  09/24/24 13:39  09/24/24 13:39  09/24/24 13:39














- General


well nourished, no distress





- Eyes


normal ocular movement





- ENT


no hearing loss





- Neck


trachea midline





- Abdomen





Soft, well-healed surgical scarring, nondistended, no rebound or guarding, 

tenderness to palpation in bilateral lower quadrants





- Psychiatric


oriented to time, oriented to person, oriented to place





Results





- Labs





                                 09/25/24 03:14





                                 09/25/24 03:14


                  Abnormal Lab Results - Last 24 Hours (Table)











  09/24/24 09/24/24 09/25/24 Range/Units





  15:18 15:18 03:14 


 


RBC    3.85 L  (4.10-5.60)  X 10*6/uL


 


Hgb    11.5 L  (12.0-17.0)  g/dL


 


Hct    35.9 L  (37.2-50.0)  %


 


RDW    14.7 H  (11.5-14.5)  %


 


Lymphocytes #  0.5 L   0.56 L  (1.0-4.8)  k/uL


 


Eosinophils #    0.01 L  (0.04-0.35)  X 10*3/uL


 


Potassium   5.7 H   (3.5-5.1)  mmol/L


 


Chloride   89 L   ()  mmol/L


 


Carbon Dioxide   33 H   (22-30)  mmol/L


 


Anion Gap     (4.00-12.00)  mmol/L


 


BUN   37 H   (9-20)  mg/dL


 


Creatinine   7.16 H*   (0.66-1.25)  mg/dL


 


Est GFR (CKD-EPI)     (>=60)   


 


BUN/Creatinine Ratio     (12.00-20.00)  Ratio


 


Glucose   180 H   (74-99)  mg/dL


 


Calcium   10.7 H   (8.4-10.2)  mg/dL


 


Total Bilirubin   1.4 H   (0.2-1.3)  mg/dL


 


AST     (13-35)  U/L


 


Total Protein   8.8 H   (6.3-8.2)  g/dL


 


Albumin   5.1 H   (3.5-5.0)  g/dL


 


Albumin/Globulin Ratio     (1.60-3.17)  Ratio














  09/25/24 Range/Units





  03:14 


 


RBC   (4.10-5.60)  X 10*6/uL


 


Hgb   (12.0-17.0)  g/dL


 


Hct   (37.2-50.0)  %


 


RDW   (11.5-14.5)  %


 


Lymphocytes #   (1.0-4.8)  k/uL


 


Eosinophils #   (0.04-0.35)  X 10*3/uL


 


Potassium  5.7 H  (3.5-5.1)  mmol/L


 


Chloride  91 L  ()  mmol/L


 


Carbon Dioxide   (22-30)  mmol/L


 


Anion Gap  19.70 H  (4.00-12.00)  mmol/L


 


BUN  46.8 H  (9-20)  mg/dL


 


Creatinine  7.9 A*  (0.66-1.25)  mg/dL


 


Est GFR (CKD-EPI)  7 L  (>=60)   


 


BUN/Creatinine Ratio  5.92 L  (12.00-20.00)  Ratio


 


Glucose  126 H  (74-99)  mg/dL


 


Calcium   (8.4-10.2)  mg/dL


 


Total Bilirubin   (0.2-1.3)  mg/dL


 


AST  11 L  (13-35)  U/L


 


Total Protein   (6.3-8.2)  g/dL


 


Albumin   (3.5-5.0)  g/dL


 


Albumin/Globulin Ratio  1.56 L  (1.60-3.17)  Ratio








                                 Diabetes panel











  09/24/24 09/25/24 Range/Units





  15:18 03:14 


 


Sodium  138  137  (137-145)  mmol/L


 


Potassium  5.7 H  5.7 H  (3.5-5.1)  mmol/L


 


Chloride  89 L  91 L  ()  mmol/L


 


Carbon Dioxide  33 H  26.3  (22-30)  mmol/L


 


BUN  37 H  46.8 H  (9-20)  mg/dL


 


Creatinine  7.16 H*  7.9 A*  (0.66-1.25)  mg/dL


 


Glucose  180 H  126 H  (74-99)  mg/dL


 


Calcium  10.7 H  9.2  (8.4-10.2)  mg/dL


 


AST  19  11 L  (17-59)  U/L


 


ALT  14  10  (4-49)  U/L


 


Alkaline Phosphatase  121  99  ()  U/L


 


Total Protein  8.8 H  6.4  (6.3-8.2)  g/dL


 


Albumin  5.1 H  3.9  (3.5-5.0)  g/dL








                                  Calcium panel











  09/24/24 09/25/24 Range/Units





  15:18 03:14 


 


Calcium  10.7 H  9.2  (8.4-10.2)  mg/dL


 


Albumin  5.1 H  3.9  (3.5-5.0)  g/dL








                                 Pituitary panel











  09/24/24 09/25/24 Range/Units





  15:18 03:14 


 


Sodium  138  137  (137-145)  mmol/L


 


Potassium  5.7 H  5.7 H  (3.5-5.1)  mmol/L


 


Chloride  89 L  91 L  ()  mmol/L


 


Carbon Dioxide  33 H  26.3  (22-30)  mmol/L


 


BUN  37 H  46.8 H  (9-20)  mg/dL


 


Creatinine  7.16 H*  7.9 A*  (0.66-1.25)  mg/dL


 


Glucose  180 H  126 H  (74-99)  mg/dL


 


Calcium  10.7 H  9.2  (8.4-10.2)  mg/dL








                                  Adrenal panel











  09/24/24 09/25/24 Range/Units





  15:18 03:14 


 


Sodium  138  137  (137-145)  mmol/L


 


Potassium  5.7 H  5.7 H  (3.5-5.1)  mmol/L


 


Chloride  89 L  91 L  ()  mmol/L


 


Carbon Dioxide  33 H  26.3  (22-30)  mmol/L


 


BUN  37 H  46.8 H  (9-20)  mg/dL


 


Creatinine  7.16 H*  7.9 A*  (0.66-1.25)  mg/dL


 


Glucose  180 H  126 H  (74-99)  mg/dL


 


Calcium  10.7 H  9.2  (8.4-10.2)  mg/dL


 


Total Bilirubin  1.4 H  1.0  (0.2-1.3)  mg/dL


 


AST  19  11 L  (17-59)  U/L


 


ALT  14  10  (4-49)  U/L


 


Alkaline Phosphatase  121  99  ()  U/L


 


Total Protein  8.8 H  6.4  (6.3-8.2)  g/dL


 


Albumin  5.1 H  3.9  (3.5-5.0)  g/dL














Assessment and Plan


Plan: 





59-year-old male with concern for small bowel obstruction, partial versus ileus.

 We will attain a small bowel follow-through study for further evaluation of 

obstructive process.  Nephrology recommendations on patient's elevated 

creatinine and dialysis regimen.  Further recommendations based on patient's 

imaging findings.

## 2024-09-25 NOTE — P.CNPUL
History of Present Illness


Consult date: 09/25/24


Chief complaint: Hypotension, small bowel obstruction


History of present illness: 





This is a 59-year-old male patient who got transferred to the ICU because of 

hypotension.  The patient is admitted currently for small bowel obstruction.  

The patient is known to have end-stage renal disease on hemodialysis and his 

last hemodialysis session was yesterday.  He gets dialyzed MWF.  He has had 

previous kidney transplantation and has essentially failed.  He has had previous

surgeries in his abdomen for transplantation.  He presented to the hospital 

because of abdominal pain, cramping, nausea and emesis and his last bowel 

movement was around 4 days ago.  CAT scan of the abdomen showed dilated small 

bowel consistent with ileus/small bowel obstruction.  General surgery is on the 

case.  Because of ongoing discomfort, NG tube was inserted and immediately 

approximately 2 L of gastric material was aspirated.  Subsequently patient 

became hypotensive.  He starts cardiac blood pressure is was in the mid 70s.  He

got transferred to the ICU.  Given half a liter of normal saline and the current

blood pressure is 113/61.  He is awake and alert.  He is currently on room air 

oxygen with a pulse ox of 99%.  No significant tachycardia.  No reported fever. 

The white cell count is at 6.2 with a hemoglobin of 11.5 and a platelet count of

166.  BUN 65 with a creatinine of 9.7 and the patient's potassium level is at 

5.8 and a sodium level of 136.  Amylase and lipase are within normal limits.  

LFTs are within normal limits.  CAT scan of the abdomen also showed a loculated 

pleural effusion in the left lung base above the left hemidiaphragm with a ca

lcified rim which is a sequelae of previous pneumonia/parapneumonic effusion.  

He has another calcified rim collection in the mid pelvis.  Denies having any 

cough or sputum production.  No significant respiratory distress.





Review of Systems


Constitutional: Denies chills, Denies fever


Eyes: denies as per HPI, denies blurred vision, denies bulging eye, denies 

decreased vision, denies diplopia, denies discharge, denies dry eye, denies 

irritation, denies itching, denies pain, denies photophobia, denies loss of 

peripheral vision, denies loss of vision, denies tunnel vision/blind spots


Ears: deny: decreased hearing, ear discharge, earache, tinnitus


Ears, nose, mouth and throat: Reports as per HPI


Breasts: absent: as per HPI, gynecomastia


Respiratory: Reports as per HPI


Gastrointestinal: Reports abdominal pain, Reports nausea, Reports vomiting


Genitourinary: Reports as per HPI


Musculoskeletal: Reports as per HPI


Musculoskeletal: absent: ankle pain, ankle stiffness, ankle swelling, as per 

HPI, elbow pain, elbow stiffness, elbow swelling, foot pain, foot stiffness, 

foot swelling, hand pain, hand stiffness, hand swelling, hip pain, hip 

stiffness, hip swelling, knee pain, knee stiffness, knee swelling, shoulder 

pain, shoulder stiffness, shoulder swelling, wrist pain, wrist stiffness, wrist 

swelling


Integumentary: Reports as per HPI


Neurological: Reports as per HPI


Psychiatric: Reports as per HPI


Endocrine: Reports as per HPI


Hematologic/Lymphatic: Reports as per HPI


Allergic/Immunologic: Reports as per HPI





Past Medical History


Past Medical History: COPD, GERD/Reflux, Hyperlipidemia, Hypertension, Renal 

Disease


Additional Past Medical History / Comment(s): lt kidney transplant- timur 

neuropathy,  hepatitis b from hemodialysis. KIDNEY TRANSPLANT X3 . HEMO  

DYALYSIS MON WED FRI


History of Any Multi-Drug Resistant Organisms: None Reported


Past Surgical History: Hernia Repair, Tonsillectomy


Additional Past Surgical History / Comment(s): colonoscopy, crystal nephrectomy , 

then transplanted lt kidney last 2003, abd hernia repair(4);


Past Anesthesia/Blood Transfusion Reactions: No Reported Reaction


Past Psychological History: No Psychological Hx Reported


Smoking Status: Former smoker


Past Alcohol Use History: None Reported


Additional Past Alcohol Use History / Comment(s): STARTED SMOKING AT AGE 12    

QUIT SMOKING IN 2004   SMOKED 2PPD


Past Drug Use History: None Reported





- Past Family History


  ** Mother


Family Medical History: No Reported History





  ** Father


Additional Family Medical History / Comment(s): nerve conditon





Medications and Allergies


                                Home Medications











 Medication  Instructions  Recorded  Confirmed  Type


 


Aspirin 81 mg PO DAILY 04/24/17 09/24/24 History


 


Omeprazole 20 mg PO DAILY 04/24/17 09/24/24 History


 


Albuterol Inhaler [Ventolin Hfa 2 puff INHALATION RT-Q4H PRN 09/24/24 09/24/24 

History





Inhaler]    


 


Albuterol Nebulized [Ventolin 2.5 mg INHALATION RT-QID 09/24/24 09/24/24 History





Nebulized]    


 


Calcium Acetate 668mg 2,004 mg PO TID-W/MEALS 09/24/24 09/24/24 History


 


Cinacalcet HCl [Sensipar] 60 mg PO W/SUPPER 09/24/24 09/24/24 History


 


Ondansetron Odt [Zofran Odt] 4 mg PO TID PRN 09/24/24 09/24/24 History


 


Pravastatin Sodium [Pravachol] 40 mg PO HS 09/24/24 09/24/24 History


 


Tenofovir Disoproxil Fumarate 300 mg PO FR 09/24/24 09/24/24 History





[Viread]    


 


Tiotropium 2.5 Mcg/Puff [Spiriva 2 puff INHALATION RT-DAILY 09/24/24 09/24/24 

History





Respimat 2.5 Mcg]    


 


Vit B Comp No.3/Folic/C/Biotin 1 tab PO DAILY 09/24/24 09/24/24 History





[Bhumika-Rene Rx Tablet]    


 


clonazePAM [KlonoPIN ODT] 0.25 mg PO HS PRN 09/24/24 09/24/24 History








                                    Allergies











Allergy/AdvReac Type Severity Reaction Status Date / Time


 


No Known Allergies Allergy   Verified 09/24/24 15:39














Physical Exam


Vitals: 


                                   Vital Signs











  Temp Pulse Pulse Resp BP BP Pulse Ox


 


 09/25/24 17:08   88     


 


 09/25/24 17:00   85   19  113/61   99


 


 09/25/24 16:59   86     


 


 09/25/24 16:50   90   20  76/48   90 L


 


 09/25/24 16:40   88   18  76/50  


 


 09/25/24 16:30   86   24  76/50   89 L


 


 09/25/24 16:20   89   23  73/48   92 L


 


 09/25/24 16:10   90   22  73/48   89 L


 


 09/25/24 16:00  98.6 F  90   18  73/49   92 L


 


 09/25/24 15:50   90   22  73/49   92 L


 


 09/25/24 15:40   92   32 H  73/49   93 L


 


 09/25/24 15:36   90   22   


 


 09/25/24 13:06   104 H     


 


 09/25/24 12:56   100     


 


 09/25/24 08:02   104 H     


 


 09/25/24 07:51   104 H     


 


 09/25/24 07:13  98.9 F   101 H  18   88/54  90 L


 


 09/25/24 01:01  99.9 F H   107 H  18   106/63  93 L


 


 09/24/24 20:46  98.5 F   107 H  17   122/47  98


 


 09/24/24 20:37   101 H     


 


 09/24/24 20:28   97     


 


 09/24/24 20:17   101 H   18  107/70   95








                                Intake and Output











 09/25/24 09/25/24 09/25/24





 06:59 14:59 22:59


 


Intake Total   500


 


Output Total   2500


 


Balance   -2000


 


Intake:   


 


  IV   500


 


    Sodium Chloride 0.9% 500   500





    ml 500 ml @ 999 mls/hr IV   





    .Q31M STA Rx#:[id number]   


 


Output:   


 


  Gastric Drainage   2500














- Constitutional


General appearance: no acute distress





- EENT


Eyes: EOMI


Ears: negative: bulging, bullous, dull, erythema, fluid, myringotomy tube, 

obstructed by cerumen, scarring, unable to vistualize, other





- Neck


Neck: no lymphadenopathy


Carotids: negative: upstroke normal, upstroke delayed, upstroke diminished, 

upstroke bounding, bruit absent, bruit present


Thyroid: negative: normal size, enlarged, firm, nodule





- Respiratory


Respiratory: negative: CTA, diminished, dullness, rales, rhonchi, wheezing, 

prolonged expiration, prolonged inspiration, other





- Cardiovascular


Rhythm: regular


Heart sounds: abnormal: S1, S2





- Gastrointestinal


General gastrointestinal: absent bowel sounds





- Integumentary


Integumentary: normal, normal turgor





- Neurologic


Neurologic: CNII-XII intact





- Musculoskeletal


Musculoskeletal: gait normal





- Psychiatric


Psychiatric: A&O x's 3 (Soft, well-healed surgical scarring, nondistended, no r

ebound or guarding, tenderness to palpation in bilateral lower quadrants)





Results





- Laboratory Findings


CBC and BMP: 


                                 09/25/24 03:14





                                 09/25/24 17:06


Abnormal lab findings: 


                                  Abnormal Labs











  09/24/24 09/24/24 09/25/24





  15:18 15:18 03:14


 


RBC    3.85 L


 


Hgb    11.5 L


 


Hct    35.9 L


 


RDW    14.7 H


 


Lymphocytes #  0.5 L   0.56 L


 


Eosinophils #    0.01 L


 


Sodium   


 


Potassium   5.7 H 


 


Chloride   89 L 


 


Carbon Dioxide   33 H 


 


Anion Gap   


 


BUN   37 H 


 


Creatinine   7.16 H* 


 


Est GFR (CKD-EPI)   


 


BUN/Creatinine Ratio   


 


Glucose   180 H 


 


Calcium   10.7 H 


 


Total Bilirubin   1.4 H 


 


AST   


 


Total Protein   8.8 H 


 


Albumin   5.1 H 


 


Albumin/Globulin Ratio   














  09/25/24 09/25/24





  03:14 17:06


 


RBC  


 


Hgb  


 


Hct  


 


RDW  


 


Lymphocytes #  


 


Eosinophils #  


 


Sodium   136 L


 


Potassium  5.7 H  5.8 H


 


Chloride  91 L  88 L


 


Carbon Dioxide  


 


Anion Gap  19.70 H 


 


BUN  46.8 H  65 H


 


Creatinine  7.9 A*  9.74 H*


 


Est GFR (CKD-EPI)  7 L 


 


BUN/Creatinine Ratio  5.92 L 


 


Glucose  126 H  153 H


 


Calcium  


 


Total Bilirubin   1.8 H


 


AST  11 L  15 L


 


Total Protein  


 


Albumin  


 


Albumin/Globulin Ratio  1.56 L 














- Diagnostic Findings


Chest x-ray: image reviewed





Assessment and Plan


Plan: 








Small bowel obstruction/ileus probably due to abdominal adhesions the patient 

has undergone previous abdominal surgeries for renal transplantation.  Patient 

is status post NG tube decompression with evacuation of approximately 2 to 2.5 L

of gastric material.





Hypovolemic shock with secondary hypotension, responded to IV fluids.





End-stage renal disease on hemodialysis 3 times a week, MWF





Mild hyperkalemia related to end-stage renal disease





History of kidney transplantation x 2





History of hepatitis B





Hypertension, history of





Hyperlipidemia





COPD





Chronic pleural fluid and mid abdominal fluid collection with a calcified rim.  

The first is most likely a sequela of a previous pneumonia and parapneumonic 

effusion.  The second is most likely related to previous abdominal surgeries.  

Those are benign findings





Plan





Give the patient a total of 1 L of fluid and monitor the blood pressure


Should be able to complete hemodialysis and this will be discussed with 

nephrology.  Dialysis can be performed today in the ICU.  Will support the blood

pressure and use pressors if needed.


General surgery consultation


Keep n.p.o.


NG tube in place


Monitor electrolytes


Lactic acid level is not elevated


Nephrology consultation


Will continue to follow

## 2024-09-25 NOTE — P.PN
Progress Note - Text


Progress Note Date: 09/25/24





Patient in the process of undergoing a small bowel follow-through test.  I was 

notified by radiology service that at the 3-hour vira contrast is remaining in 

the stomach and patient is vomiting.  They are concerned for possible aspiration

and recommending NG tube placement.  Agreeable with NG tube placement.  NG tube 

has been ordered.  Radiology does intend to continue further imaging.  Zofran 

dosage changed to every 6 hours as needed for nausea.

## 2024-09-26 LAB
ALBUMIN SERPL-MCNC: 3.7 G/DL (ref 3.5–5)
ALP SERPL-CCNC: 79 U/L (ref 38–126)
ALT SERPL-CCNC: 10 U/L (ref 4–49)
ANION GAP SERPL CALC-SCNC: 21 MMOL/L
AST SERPL-CCNC: 17 U/L (ref 17–59)
BASOPHILS # BLD AUTO: 0 K/UL (ref 0–0.2)
BASOPHILS NFR BLD AUTO: 0 %
BUN SERPL-SCNC: 49 MG/DL (ref 9–20)
CALCIUM SPEC-MCNC: 9.4 MG/DL (ref 8.4–10.2)
CHLORIDE SERPL-SCNC: 92 MMOL/L (ref 98–107)
CO2 SERPL-SCNC: 23 MMOL/L (ref 22–30)
EOSINOPHIL # BLD AUTO: 0 K/UL (ref 0–0.7)
EOSINOPHIL NFR BLD AUTO: 0 %
ERYTHROCYTE [DISTWIDTH] IN BLOOD BY AUTOMATED COUNT: 3.72 M/UL (ref 4.3–5.9)
ERYTHROCYTE [DISTWIDTH] IN BLOOD: 14.6 % (ref 11.5–15.5)
GLUCOSE SERPL-MCNC: 104 MG/DL (ref 74–99)
HCT VFR BLD AUTO: 35.7 % (ref 39–53)
HGB BLD-MCNC: 11.3 GM/DL (ref 13–17.5)
LYMPHOCYTES # SPEC AUTO: 0.6 K/UL (ref 1–4.8)
LYMPHOCYTES NFR SPEC AUTO: 11 %
MAGNESIUM SPEC-SCNC: 2 MG/DL (ref 1.6–2.3)
MCH RBC QN AUTO: 30.2 PG (ref 25–35)
MCHC RBC AUTO-ENTMCNC: 31.6 G/DL (ref 31–37)
MCV RBC AUTO: 95.8 FL (ref 80–100)
MONOCYTES # BLD AUTO: 0.3 K/UL (ref 0–1)
MONOCYTES NFR BLD AUTO: 7 %
NEUTROPHILS # BLD AUTO: 3.9 K/UL (ref 1.3–7.7)
NEUTROPHILS NFR BLD AUTO: 79 %
PLATELET # BLD AUTO: 153 K/UL (ref 150–450)
POTASSIUM SERPL-SCNC: 5.3 MMOL/L (ref 3.5–5.1)
PROT SERPL-MCNC: 6.4 G/DL (ref 6.3–8.2)
SODIUM SERPL-SCNC: 136 MMOL/L (ref 137–145)
WBC # BLD AUTO: 5 K/UL (ref 3.8–10.6)

## 2024-09-26 RX ADMIN — CEFAZOLIN SCH MLS/HR: 330 INJECTION, POWDER, FOR SOLUTION INTRAMUSCULAR; INTRAVENOUS at 11:57

## 2024-09-26 NOTE — P.CRDCN
History of Present Illness


Consult date: 09/26/24


History of present illness: 





History of Present Illness:


The patient is a 59-year-old male, end-stage renal disease status post 

transplant that failed his on hemodialysis and presented with abdominal 

discomfort and small bowel obstruction.  He was transferred to the ICU because 

of hypotension.  He had a run of SVT and cardiology consultation was requested. 

He is in sinus mechanism at this time on IV Cardizem.  He is hemodynamically 

stable.  He denies any chest discomfort, dizziness or palpitations.  He has no 

significant dyspnea.  He has a history of COPD.  He is followed by cardiologist 

at [facility] and had a prior stenting about 5 years ago and has a 

history of peripheral vascular disease with femorofemoral bypass.  He has no 

clear PND, orthopnea or peripheral edema.  He cannot recall a prior documented 

history of malignant arrhythmia.  His ejection fraction estimation is not 

available to me.  He has an NG tube and feels better with minimal flatus but 

improvement in his abdominal discomfort.  He has no history of smoking.  He had 

a history of renal disease at a young age.  He has no urinary output.  He has a 

history of hyperlipidemia, he is nondiabetic.


Medications: Sensipar, aspirin, Spiriva, Ventolin, pravastatin, Klonopin, 

calcium acetate, vitamin B





Review of Systems:


Respiratory: He has dyspnea on exertion and carries the diagnosis of COPD


GI: He had abdominal pain with nausea and vomiting


: He is end-stage renal disease, status post transplant, on hemodialysis and 

no urinary output


Nervous System: No stroke or seizure.





Physical Examination: 


59-year-old male, alert oriented no apparent distress, NG tube in place,Blood 

pressure 92/55, Heart rate 80


Head: Normocephalic.


Eyes: Sclerae nonicteric.


Neck: Good carotid upstroke, right-sided bruit, no jugular venous distention.


Lungs: Clear to auscultation.


Heart: Regular rate and rhythm, S1-S2, no S3, no rub.  Systolic ejection murmur.


Abdomen: Soft nontender, hypoactive bowel sounds no organomegaly.


Extremities: No edema, decrease distal pulses.





Labs: 


Hemoglobin 11.3, WBC 5.0, BUN 42, creatinine 6.88.  Potassium 4.8.  Chest x-ray 

with left lower lobe infiltrate


EKG:


Pending


Impression:


1.  Small bowel obstruction, improving


2.  History of CAD status post stenting with no evidence of acute coronary 

syndrome


3.  SVT, back in sinus mechanism


4.  End-stage renal disease status post transplant with transplant failure, on 

dialysis


5.  History of hyperlipidemia


6.  Hypotension secondary to the small bowel obstruction, improving





Plan:


1.  Obtain an echocardiogram with Doppler


2.  Continue IV Cardizem for now


3.  Once taking oral change to oral medication and resume statin and aspirin


4.  Depending on his progress further recommendations will be made


5.  Thank you for this consult we will follow with you.





Past Medical History


Past Medical History: COPD, GERD/Reflux, Hyperlipidemia, Hypertension, Renal 

Disease


Additional Past Medical History / Comment(s): lt kidney transplant- timur 

neuropathy,  hepatitis b from hemodialysis. KIDNEY TRANSPLANT X3 . HEMO  

DYALYSIS MON WED FRI


History of Any Multi-Drug Resistant Organisms: None Reported


Past Surgical History: Hernia Repair, Tonsillectomy


Additional Past Surgical History / Comment(s): colonoscopy, crystal nephrectomy , 

then transplanted lt kidney last 2003, abd hernia repair(4);


Past Anesthesia/Blood Transfusion Reactions: No Reported Reaction


Past Psychological History: No Psychological Hx Reported


Smoking Status: Former smoker


Past Alcohol Use History: None Reported


Additional Past Alcohol Use History / Comment(s): STARTED SMOKING AT AGE 12    

QUIT SMOKING IN 2004   SMOKED 2PPD


Past Drug Use History: None Reported





- Past Family History


  ** Mother


Family Medical History: No Reported History





  ** Father


Additional Family Medical History / Comment(s): nerve conditon





Medications and Allergies


                                Home Medications











 Medication  Instructions  Recorded  Confirmed  Type


 


Aspirin 81 mg PO DAILY 04/24/17 09/24/24 History


 


Omeprazole 20 mg PO DAILY 04/24/17 09/24/24 History


 


Albuterol Inhaler [Ventolin Hfa 2 puff INHALATION RT-Q4H PRN 09/24/24 09/24/24 

History





Inhaler]    


 


Albuterol Nebulized [Ventolin 2.5 mg INHALATION RT-QID 09/24/24 09/24/24 History





Nebulized]    


 


Calcium Acetate 668mg 2,004 mg PO TID-W/MEALS 09/24/24 09/24/24 History


 


Cinacalcet HCl [Sensipar] 60 mg PO W/SUPPER 09/24/24 09/24/24 History


 


Ondansetron Odt [Zofran Odt] 4 mg PO TID PRN 09/24/24 09/24/24 History


 


Pravastatin Sodium [Pravachol] 40 mg PO HS 09/24/24 09/24/24 History


 


Tenofovir Disoproxil Fumarate 300 mg PO FR 09/24/24 09/24/24 History





[Viread]    


 


Tiotropium 2.5 Mcg/Puff [Spiriva 2 puff INHALATION RT-DAILY 09/24/24 09/24/24 

History





Respimat 2.5 Mcg]    


 


Vit B Comp No.3/Folic/C/Biotin 1 tab PO DAILY 09/24/24 09/24/24 History





[Bhumika-Rene Rx Tablet]    


 


clonazePAM [KlonoPIN ODT] 0.25 mg PO HS PRN 09/24/24 09/24/24 History








                                    Allergies











Allergy/AdvReac Type Severity Reaction Status Date / Time


 


No Known Allergies Allergy   Verified 09/24/24 15:39














Physical Exam


Vitals: 


                                   Vital Signs











  Temp Pulse Pulse Resp BP BP Pulse Ox


 


 09/26/24 07:00   80   22  92/55   92 L


 


 09/26/24 06:30   84   12  97/53   95


 


 09/26/24 06:00   81   24  92/53   96


 


 09/26/24 05:30   83   20  89/57   95


 


 09/26/24 05:00   83   24  93/56   95


 


 09/26/24 04:30   85   20  99/53   96


 


 09/26/24 04:00  98.6 F  83   22  93/54   96


 


 09/26/24 03:30   84   24  93/55   95


 


 09/26/24 03:00   83   20  94/52   95


 


 09/26/24 02:30   82   24  90/50   95


 


 09/26/24 02:00   83   24  93/55   96


 


 09/26/24 01:30   85   14  91/58   95


 


 09/26/24 01:00   89   23  95/55   96


 


 09/26/24 00:30   89   22  90/54   97


 


 09/26/24 00:00  98.1 F  90   24  91/59   96


 


 09/25/24 23:30   92   23  83/53   95


 


 09/25/24 23:00   112 H   17    97


 


 09/25/24 22:38   96   20  89/56   96


 


 09/25/24 22:30   94   24  93/54   95


 


 09/25/24 22:00   95   12  98/58   95


 


 09/25/24 21:30   92   24  89/56   95


 


 09/25/24 21:21   93     


 


 09/25/24 21:17   82     


 


 09/25/24 21:00   102 H   23  86/55   95


 


 09/25/24 20:51  98 F   94  23   93/68 


 


 09/25/24 20:30   96   23  112/69   97


 


 09/25/24 20:00   122 H   14  95/62   96


 


 09/25/24 19:30   96   17  90/54   95


 


 09/25/24 19:00   89   28 H  105/55   91 L


 


 09/25/24 18:50   95   21  92/52  


 


 09/25/24 18:40   84   21  93/51  


 


 09/25/24 18:30   85   8 L  71/51  


 


 09/25/24 18:20   87   22  87/58  


 


 09/25/24 18:10   87   25 H  87/63  


 


 09/25/24 18:00   87   21  84/50  


 


 09/25/24 17:50   88   13  84/50   91 L


 


 09/25/24 17:40   88   25 H  85/52   92 L


 


 09/25/24 17:30   87   10 L  85/52  


 


 09/25/24 17:20   87   25 H  113/61   95


 


 09/25/24 17:10   87   37 H  113/61   96


 


 09/25/24 17:08   88     


 


 09/25/24 17:00   85   19  113/61   99


 


 09/25/24 16:59   86     


 


 09/25/24 16:50   90   20  76/48   90 L


 


 09/25/24 16:40   88   18  76/50  


 


 09/25/24 16:30   86   24  76/50   89 L


 


 09/25/24 16:20   89   23  73/48   92 L


 


 09/25/24 16:10   90   22  73/48   89 L


 


 09/25/24 16:00  98.6 F  90   18  73/49   92 L


 


 09/25/24 15:50   90   22  73/49   92 L


 


 09/25/24 15:40   92   32 H  73/49   93 L


 


 09/25/24 15:36   90   22   


 


 09/25/24 13:06   104 H     


 


 09/25/24 12:56   100     


 


 09/25/24 08:02   104 H     


 


 09/25/24 07:51   104 H     








                                Intake and Output











 09/25/24 09/26/24 09/26/24





 22:59 06:59 14:59


 


Intake Total 1250  


 


Output Total 3800 300 


 


Balance -2550 -300 


 


Intake:   


 


    


 


    Sodium Chloride 0.9% 500 500  





    ml 500 ml @ 999 mls/hr IV   





    .Q31M STA Rx#:[id number]   


 


  Hemodialysis 750  


 


Output:   


 


  Gastric Drainage 3050 300 


 


  Urine 0 0 


 


  Hemodialysis 500  


 


  Hemodialysis Net Amount 250  


 


Other:   


 


  Weight  81.2 kg 














Results





                                 09/26/24 06:09





                                 09/25/24 21:51


                                 Cardiac Enzymes











  09/25/24 09/25/24 Range/Units





  03:14 17:06 


 


AST  11 L  15 L  (13-35)  U/L








                                       CBC











  09/25/24 09/26/24 Range/Units





  03:14 06:09 


 


WBC  6.23  5.0  (4.50-10.00)  X 10*3/uL


 


RBC  3.85 L  3.72 L  (4.10-5.60)  X 10*6/uL


 


Hgb  11.5 L  11.3 L D  (12.0-17.0)  g/dL


 


Hct  35.9 L  35.7 L  (37.2-50.0)  %


 


Plt Count  166  153  (140-440)  X 10*3/uL








                          Comprehensive Metabolic Panel











  09/25/24 09/25/24 09/25/24 Range/Units





  03:14 17:06 21:51 


 


Sodium  137  136 L  135 L  (135-145)  mmol/L


 


Potassium  5.7 H  5.8 H  4.8  (3.5-5.5)  mmol/L


 


Chloride  91 L  88 L  91 L  ()  mmol/L


 


Carbon Dioxide  26.3  29  26  (21.6-31.8)  mmol/L


 


BUN  46.8 H  65 H  42 H  (9.0-27.0)  mg/dL


 


Creatinine  7.9 A*  9.74 H*  6.88 H  (0.6-1.5)  mg/dL


 


Glucose  126 H  153 H  104 H  ()  mg/dL


 


Calcium  9.2  9.4  9.3  (8.7-10.3)  mg/dL


 


AST  11 L  15 L   (13-35)  U/L


 


ALT  10  12   (8-49)  U/L


 


Alkaline Phosphatase  99  86   ()  U/L


 


Total Protein  6.4  6.7   (6.2-8.2)  g/dL


 


Albumin  3.9  4.0   (3.8-4.9)  g/dL








                               Current Medications











Generic Name Dose Route Start Last Admin





  Trade Name Freq  PRN Reason Stop Dose Admin


 


Acetaminophen  650 mg  09/24/24 18:21  09/24/24 21:09





  Acetaminophen Tab 325 Mg Tab  PO   650 mg





  Q6HR PRN   Administration





  Mild Pain or Fever > 100.5  


 


Hydrocodone Bitart/Acetaminophen  1 each  09/25/24 00:56  09/25/24 12:04





  Hydrocodone/Apap 5-325mg 1 Each Tab  PO   1 each





  Q6HR PRN   Administration





  Pain  


 


Albuterol Sulfate  2 puff  09/24/24 18:26 





  Albuterol Hfa Inhaler  INHALATION  





  RT-Q4H PRN  





  Shortness Of Breath  


 


Albuterol Sulfate  2.5 mg  09/24/24 20:00  09/25/24 21:14





  Albuterol Nebulized 2.5 Mg/3 Ml  INHALATION   2.5 mg





  RT-QID ATUL   Administration


 


Aspirin  81 mg  09/25/24 09:00  09/25/24 08:29





  Aspirin 81 Mg  PO   81 mg





  DAILY ATUL   Administration


 


Clonazepam  0.25 mg  09/24/24 18:26 





  Clonazepam 0.5 Mg Tab  PO  





  HS PRN  





  Anxiety  


 


Heparin Sodium (Porcine)  5,000 unit  09/25/24 13:45  09/25/24 22:13





  Heparin Sodium,Porcine 5,000 Unit/Ml 1 Ml Vial  SQ   5,000 unit





  Q12HR ATUL   Administration


 


Hydromorphone HCl  0.5 mg  09/25/24 13:43  09/25/24 14:09





  Hydromorphone 0.5 Mg/0.5 Ml Syringe  IVP   0.5 mg





  Q4HR PRN   Administration





  Severe Pain (Scale 7 to 10)  


 


Diltiazem HCl 125 mg/ Sodium  125 mls @ 10 mls/hr  09/25/24 23:15  09/25/24 

23:16





  Chloride  IV   10 mg/hr





  .O16H29E ATUL   10 mls/hr





    Administration





  10 MG/HR  


 


Midodrine  10 mg  09/25/24 17:30  09/25/24 16:19





  Midodrine 5 Mg Tab  PO   Not Given





  AC-TID ATUL  


 


Multivit/Ca Carb/B Cmplx/FA/Prenat  1 each  09/25/24 09:00  09/25/24 08:29





  Folic Acid-Vit B Complex-Vit C 1 Cap  PO   1 each





  DAILY ATUL   Administration


 


Naloxone HCl  0.2 mg  09/24/24 18:21 





  Naloxone 0.4 Mg/Ml 1 Ml Vial  IV  





  Q2M PRN  





  Opioid Reversal  


 


Tenofovir Disoproxil  300 mg  09/27/24 09:00 





Fumarate [Viread]  PO  





300 Mg Tablet  FR ATUL  


 


Ondansetron HCl  4 mg  09/25/24 14:08 





  Ondansetron 4 Mg/2 Ml Vial  IVP  





  Q6HR PRN  





  Nausea And Vomiting  


 


Pantoprazole Sodium  40 mg  09/25/24 21:00  09/25/24 22:13





  Pantoprazole 40 Mg/10 Ml Vial  IVP   40 mg





  BID ATUL   Administration


 


Pravastatin Sodium  40 mg  09/24/24 21:00  09/25/24 22:10





  Pravastatin Sodium 40 Mg Tab  PO   Not Given





  HS ATUL  


 


Tiotropium Bromide  2 puff  09/25/24 08:00  09/25/24 13:02





  Tiotropium 2.5 Mcg Inhaler  INHALATION   2 puff





  RT-DAILY ATUL   Administration








                                Intake and Output











 09/25/24 09/26/24 09/26/24





 22:59 06:59 14:59


 


Intake Total 1250  


 


Output Total 3800 300 


 


Balance -2550 -300 


 


Intake:   


 


    


 


    Sodium Chloride 0.9% 500 500  





    ml 500 ml @ 999 mls/hr IV   





    .Q31M STA Rx#:[id number]   


 


  Hemodialysis 750  


 


Output:   


 


  Gastric Drainage 3050 300 


 


  Urine 0 0 


 


  Hemodialysis 500  


 


  Hemodialysis Net Amount 250  


 


Other:   


 


  Weight  81.2 kg 








                                        





                                 09/26/24 06:09 





                                 09/25/24 21:51

## 2024-09-26 NOTE — FL
EXAMINATION TYPE: FL small bowel follow through

 

DATE OF EXAM: 9/26/2024

 

CLINICAL HISTORY: 2/25/2016

 

TECHNIQUE:  A single contrast  small bowel follow through is performed utilizing barium.  

 

COMPARISON: None

 

FINDINGS:   image of the abdomen shows large calcification in the abdomen. Aneurysmal dilation o
f the iliac artery noted. Degenerative changes of the spine. Additional vascular calcifications. Bila
teral hip arthropathy.

 

There is delayed transit of small bowel contrast with no colonic contrast seen in 330 minutes. No add
itional imaging is submitted for interpretation. Retained fecal debris throughout the colon correlate
 for constipation

 

 

IMPRESSION:

1. Limited exam terminated and 330 minutes with no contrast seen within the colon. Bowel obstruction 
the differential diagnosis..

2. Correlate for constipation.

 

[facility], Workstation: [id number], 9/26/2024 8:37 AM

## 2024-09-26 NOTE — P.PN
Subjective


Patient seen and evaluated at bedside. Patient denies nausea/vomiting.  

nasogastric tube decreased output. 








Objective





- Vital Signs


Vital signs: 


                                   Vital Signs











Temp  98.0 F   09/26/24 20:00


 


Pulse  77   09/26/24 20:46


 


Resp  21   09/26/24 20:00


 


BP  105/57   09/26/24 20:00


 


Pulse Ox  94 L  09/26/24 20:00


 


FiO2      








                                 Intake & Output











 09/26/24 09/26/24 09/27/24





 06:59 18:59 06:59


 


Intake Total 750 800 150


 


Output Total 1600 150 0


 


Balance -850 650 150


 


Weight 81.2 kg  


 


Intake:   


 


  IV  375 150


 


    Sodium Chloride 0.9% 1,  375 150





    000 ml @ 75 mls/hr IV .   





    [id number] ATUL Rx#:[id number]   


 


  Intake, IV Titration  425 





  Amount   


 


    Diltiazem 125 mg In  125 





    Sodium Chloride 0.9% 100   





    ml @ 10 MG/HR 10 mls/hr   





    IV .[id number] ATUL Rx#:   





    [id number]   


 


    Sodium Chloride 0.9% 1,  300 





    000 ml @ 75 mls/hr IV .   





    [id number] ATUL Rx#:[id number]   


 


  Hemodialysis 750  


 


Output:   


 


  Gastric Drainage 850 150 


 


  Urine 0 0 0


 


  Hemodialysis 500  


 


  Hemodialysis Net Amount 250  














- Exam





gen;nad


cv; rrr


pul; non labored breathing


abd; soft, mild distention, no guarding or rebound tenderness





- Labs


CBC & Chem 7: 


                                 09/26/24 06:09





                                 09/26/24 06:09


Labs: 


                  Abnormal Lab Results - Last 24 Hours (Table)











  09/25/24 09/26/24 09/26/24 Range/Units





  21:51 06:09 06:09 


 


RBC   3.72 L   (4.30-5.90)  m/uL


 


Hgb   11.3 L D   (13.0-17.5)  gm/dL


 


Hct   35.7 L   (39.0-53.0)  %


 


Lymphocytes #   0.6 L   (1.0-4.8)  k/uL


 


Sodium  135 L   136 L  (137-145)  mmol/L


 


Potassium    5.3 H  (3.5-5.1)  mmol/L


 


Chloride  91 L   92 L  ()  mmol/L


 


BUN  42 H   49 H  (9-20)  mg/dL


 


Creatinine  6.88 H   7.91 H*  (0.66-1.25)  mg/dL


 


Glucose  104 H   104 H  (74-99)  mg/dL














Assessment and Plan


Assessment: 





58 yo male with ileus vs small bowel obstruction


-we will continue medical management


-passing gas, we will attempt clamping of nasogastric tube and sips of clear 

liquids


-possible small bowel follow through in am.


Time with Patient: Less than 30

## 2024-09-26 NOTE — P.PN
Subjective





Patient is seen in follow-up for end-stage renal disease.  He is maintained on 

hemodialysis on Monday Wednesday Friday schedule.  Currently in the ICU.  On 

Cardizem drip for SVTs.  Completed hemodialysis yesterday.





Vital signs are stable.


General: No acute distress.


HEENT: NG tube noted.


LUNGS: No audible rhonchi or wheezes.


HEART: Irregular rate and rhythm.


ABDOMEN: Nontender.


EXTREMITITES: No edema.





Objective





- Vital Signs


Vital signs: 


                                   Vital Signs











Temp  98.6 F   09/26/24 04:00


 


Pulse  77   09/26/24 08:57


 


Resp  22   09/26/24 07:00


 


BP  92/55   09/26/24 07:00


 


Pulse Ox  92 L  09/26/24 07:00


 


FiO2      








                                 Intake & Output











 09/25/24 09/26/24 09/26/24





 18:59 06:59 18:59


 


Intake Total 500 750 


 


Output Total 2500 1600 


 


Balance -2000 -850 


 


Weight  81.2 kg 


 


Intake:   


 


    


 


    Sodium Chloride 0.9% 500 500  





    ml 500 ml @ 999 mls/hr IV   





    .Q31M STA Rx#:[id number]   


 


  Hemodialysis  750 


 


Output:   


 


  Gastric Drainage 2500 850 


 


  Urine 0 0 


 


  Hemodialysis  500 


 


  Hemodialysis Net Amount  250 














- Labs


CBC & Chem 7: 


                                 09/26/24 06:09





                                 09/26/24 06:09


Labs: 


                  Abnormal Lab Results - Last 24 Hours (Table)











  09/25/24 09/25/24 09/26/24 Range/Units





  17:06 21:51 06:09 


 


RBC    3.72 L  (4.30-5.90)  m/uL


 


Hgb    11.3 L D  (13.0-17.5)  gm/dL


 


Hct    35.7 L  (39.0-53.0)  %


 


Lymphocytes #    0.6 L  (1.0-4.8)  k/uL


 


Sodium  136 L  135 L   (137-145)  mmol/L


 


Potassium  5.8 H    (3.5-5.1)  mmol/L


 


Chloride  88 L  91 L   ()  mmol/L


 


BUN  65 H  42 H   (9-20)  mg/dL


 


Creatinine  9.74 H*  6.88 H   (0.66-1.25)  mg/dL


 


Glucose  153 H  104 H   (74-99)  mg/dL


 


Total Bilirubin  1.8 H    (0.2-1.3)  mg/dL


 


AST  15 L    (17-59)  U/L














  09/26/24 Range/Units





  06:09 


 


RBC   (4.30-5.90)  m/uL


 


Hgb   (13.0-17.5)  gm/dL


 


Hct   (39.0-53.0)  %


 


Lymphocytes #   (1.0-4.8)  k/uL


 


Sodium  136 L  (137-145)  mmol/L


 


Potassium  5.3 H  (3.5-5.1)  mmol/L


 


Chloride  92 L  ()  mmol/L


 


BUN  49 H  (9-20)  mg/dL


 


Creatinine  7.91 H*  (0.66-1.25)  mg/dL


 


Glucose  104 H  (74-99)  mg/dL


 


Total Bilirubin   (0.2-1.3)  mg/dL


 


AST   (17-59)  U/L














Assessment and Plan


Plan: 





Assessment:


1.  End-stage renal disease maintained on hemodialysis on Monday Wednesday Friday schedule.


2.  Abdominal pain due to ileus versus bowel obstruction.  Surgery following.  

Has NG tube.


3.  Coronary disease with cardiac stenting.


4.  Peripheral vascular disease status post surgical invention in the past.  

Patient follows with vascular surgery from [facility].


5.  Chronic kidney disease mineral bone disease maintained on Sensipar and 

PhosLo outpatient.


6.  History of hepatitis B maintained on tenofovir.


7.  Hyperkalemia secondary to chronic kidney disease.  Improved postdialysis.


8.  SVTs maintained on Cardizem drip.





Plan:


Hemodialysis tomorrow.


Start normal saline at 75 cc an hour.

## 2024-09-26 NOTE — P.PN
Subjective


Progress Note Date: 09/26/24


Evaluated patient today in the intensive care unit resting in bed. Not reporting

any specific abdominal pain with the exception of some mild tenderness with 

palpation.  NG tube was placed yesterday with 2L of gastric output. Ended up in 

the ICU secondary to hypotension. NG tube currently in place and patient is NPO.

He is not passing gas. Underwent small bowel follow through this AM showing 

limited exam terminated at 330 minutes with no contrast seen within the colon. 

Bowel obstruction in the differential diagnosis, correlate for constipation. 

Sodium 136, potassium 5.3, BUN 49, creatinine 7.91. Was placed on IV cardizem 

overnight due to episode of SVT. Remains on cardizem running at 10 mls/hr. On n

ormal saline at 75 mls/hr. 





Review of Systems





Constitutional: Denied any fatigue denied any fever.


Cardio vascular: denied any chest pain, palpitations


Gastrointestinal: denied any nausea, vomiting, diarrhea


Pulmonary: Denied any shortness of breath cough


Neurologic denied any new focal deficits





All inpatient medications were reviewed and appropriate changes in these 

medications as dictated in the interval history and assessment and plan.





PHYSICAL EXAMINATION: 





GENERAL: The patient is alert and oriented x3, not in any acute distress. Well 

developed, well nourished. 


HEENT: Pupils are round and equally reacting to light. EOMI. No scleral icterus.

No conjunctival pallor. Normocephalic, atraumatic. No pharyngeal erythema. No 

thyromegaly. 


CARDIOVASCULAR: S1 and S2 present. No murmurs, rubs, or gallops. 


PULMONARY: Chest is clear to auscultation, no wheezing or crackles. 


ABDOMEN: Soft, nontender, mild distended, normoactive bowel sounds. No palpable 

organomegaly. NG tube in place 


MUSCULOSKELETAL: No joint swelling or deformity.


EXTREMITIES: No cyanosis, clubbing, or pedal edema. 


NEUROLOGICAL: Gross neurological examination did not reveal any focal deficits. 


SKIN: No rashes. 





Assessment


Small bowel obstruction vs. Ileus; NG tube in place patient remains NPO, General

surgery following


End stage renal disease maintained on hemodialysis 


Episode of SVT


Hyperkalemia 


Chronic pleural effusion 


Hx of left kidney transplant


Hepatitis B


COPD with no acute exacerbation


Gastroesophageal reflux disease


Hypertension


Hyperlipidemia 


Former Smoker 


GI prophylaxis


DVT prophylaxis


Full Code





Plan 


Continue NG tube 


NPO diet and bowel rest


Continue IV cardizem and pending cardiology consultation 


General surgery following


Nephrology following, continue hemodialysis MWF; next hemodialysis session 

scheduled for tomorrow 


Repeat blood work in the AM. 


Continue to monitor patient closely in the ICU.





The impression and plan of care has been dictated by Oumou Muhammad, Nurse 

Practitioner as directed.





Dr. Jessica MD


I have performed a history and physical examination and medical decision making 

of this patient, discussed the same with the dictator, and agree with the 

dictators assessment and plan as written, documented as a scribe. Based on total

visit time, I have performed more than 50% of this visit.





Objective





- Vital Signs


Vital signs: 


                                   Vital Signs











Temp  98.6 F   09/26/24 04:00


 


Pulse  77   09/26/24 08:57


 


Resp  22   09/26/24 07:00


 


BP  92/55   09/26/24 07:00


 


Pulse Ox  92 L  09/26/24 07:00


 


FiO2      








                                 Intake & Output











 09/25/24 09/26/24 09/26/24





 18:59 06:59 18:59


 


Intake Total 500 750 


 


Output Total 2500 1600 


 


Balance -2000 -850 


 


Weight  81.2 kg 


 


Intake:   


 


    


 


    Sodium Chloride 0.9% 500 500  





    ml 500 ml @ 999 mls/hr IV   





    .Q31M STA Rx#:[id number]   


 


  Hemodialysis  750 


 


Output:   


 


  Gastric Drainage 2500 850 


 


  Urine 0 0 


 


  Hemodialysis  500 


 


  Hemodialysis Net Amount  250 














- Labs


CBC & Chem 7: 


                                 09/26/24 06:09





                                 09/26/24 06:09


Labs: 


                  Abnormal Lab Results - Last 24 Hours (Table)











  09/25/24 09/25/24 09/25/24 Range/Units





  03:14 17:06 21:51 


 


RBC     (4.30-5.90)  m/uL


 


Hgb     (13.0-17.5)  gm/dL


 


Hct     (39.0-53.0)  %


 


Lymphocytes #     (1.0-4.8)  k/uL


 


Sodium   136 L  135 L  (137-145)  mmol/L


 


Potassium  5.7 H  5.8 H   (3.5-5.5)  mmol/L


 


Chloride  91 L  88 L  91 L  ()  mmol/L


 


Anion Gap  19.70 H    (4.00-12.00)  mmol/L


 


BUN  46.8 H  65 H  42 H  (9.0-27.0)  mg/dL


 


Creatinine  7.9 A*  9.74 H*  6.88 H  (0.6-1.5)  mg/dL


 


Est GFR (CKD-EPI)  7 L    (>=60)   


 


BUN/Creatinine Ratio  5.92 L    (12.00-20.00)  Ratio


 


Glucose  126 H  153 H  104 H  ()  mg/dL


 


Total Bilirubin   1.8 H   (0.2-1.3)  mg/dL


 


AST  11 L  15 L   (13-35)  U/L


 


Albumin/Globulin Ratio  1.56 L    (1.60-3.17)  Ratio














  09/26/24 09/26/24 Range/Units





  06:09 06:09 


 


RBC  3.72 L   (4.30-5.90)  m/uL


 


Hgb  11.3 L D   (13.0-17.5)  gm/dL


 


Hct  35.7 L   (39.0-53.0)  %


 


Lymphocytes #  0.6 L   (1.0-4.8)  k/uL


 


Sodium   136 L  (137-145)  mmol/L


 


Potassium   5.3 H  (3.5-5.5)  mmol/L


 


Chloride   92 L  ()  mmol/L


 


Anion Gap    (4.00-12.00)  mmol/L


 


BUN   49 H  (9.0-27.0)  mg/dL


 


Creatinine   7.91 H*  (0.6-1.5)  mg/dL


 


Est GFR (CKD-EPI)    (>=60)   


 


BUN/Creatinine Ratio    (12.00-20.00)  Ratio


 


Glucose   104 H  ()  mg/dL


 


Total Bilirubin    (0.2-1.3)  mg/dL


 


AST    (13-35)  U/L


 


Albumin/Globulin Ratio    (1.60-3.17)  Ratio














Assessment and Plan


Time with Patient: Less than 30

## 2024-09-26 NOTE — CA
Transthoracic Echo Report 

 Name: Rad Atkinson 

 MRN:    [id number] 

 Age:    59     Gender:     M 

 

 :    1965 

 Exam Date:     2024 17:30 

 Exam Location: [facility] 

 Ht (in):     75     Wt (lb):     183 

 Ordering Physician:        Arturo Mays MD 

 Attending/Referring Phys: 

 Technician         Estela Del Rio RDCS 

 Procedure CPT: 

 Indications:       LV function 

 

 Cardiac Hx: 

 Technical Quality:      Poor 

 Contrast 1:    Definity                    Total Dose (mL):      2 

 Contrast 2:                                Total Dose (mL): 

 

 MEASUREMENTS  (Male / Female) Normal Values 

 2D ECHO 

 LV Diastolic Diameter PLAX        4.1 cm                4.2 - 5.9 / 3.9 - 5.3 cm 

 LV Systolic Diameter PLAX         3.2 cm                 

 IVS Diastolic Thickness           1.1 cm                0.6 - 1.0 / 0.6 - 0.9 cm 

 LVPW Diastolic Thickness          1.3 cm                0.6 - 1.0 / 0.6 - 0.9 cm 

 LV Relative Wall Thickness        0.6                    

 RV Internal Dim ED PLAX           5.2 cm                 

 LV Diastolic Volume MOD BP        123.4 cm???             67 - 155 / 56 - 104 cm??? 

 LV Systolic Volume MOD BP         63.3 cm???              22 - 58 / 19 - 49 cm??? 

 LV Ejection Fraction MOD BP       48.7 %                >= 55  % 

 LV Diastolic Volume MOD 4C        110.1 cm???              

 LV Systolic Volume MOD 4C         59.0 cm???               

 LV Ejection Fraction MOD 4C       46.4 %                 

 LV Diastolic Length 4C            9.8 cm                 

 LV Systolic Length 4C             8.5 cm                 

 LV Diastolic Volume MOD 2C        124.1 cm???              

 LV Systolic Volume MOD 2C         59.8 cm???               

 LV Ejection Fraction MOD 2C       51.8 %                 

 LV Diastolic Length 2C            9.7 cm                 

 LV Systolic Length 2C             7.5 cm                 

 LA Volume                         43.0 cm???              18 - 58 / 22 - 52 cm??? 

 LA Volume Index                   20.6 cm???/m???           16 - 28 cm???/m??? 

 

 M-MODE 

 Aortic Root Diameter MM           3.8 cm                 

 LA Systolic Diameter MM           4.5 cm                 

 LA Ao Ratio MM                    1.2                    

 AV Cusp Separation MM             1.3 cm                 

 

 DOPPLER 

 AV Peak Velocity                  231.8 cm/s             

 AV Peak Gradient                  21.5 mmHg              

 AV Mean Velocity                  146.9 cm/s             

 AV Mean Gradient                  10.7 mmHg              

 AV Velocity Time Integral         29.4 cm                

 LVOT Peak Velocity                129.9 cm/s             

 LVOT Peak Gradient                6.8 mmHg               

 LVOT Velocity Time Integral       19.5 cm                

 MV Area PHT                       3.5 cm???                

 Mitral E Point Velocity           94.3 cm/s              

 Mitral A Point Velocity           79.6 cm/s              

 Mitral E to A Ratio               1.2                    

 MV Deceleration Time              214.8 ms               

 MV E' Velocity                    7.4 cm/s               

 Mitral E to MV E' Ratio           12.8                   

 TR Peak Velocity                  279.4 cm/s             

 TR Peak Gradient                  31.2 mmHg              

 Right Ventricular Systolic Press  35.2 mmHg              

 

 

 FINDINGS 

 Left Ventricle 

 Left ventricular ejection fraction is estimated at 45-50%.  Mildly increased  

 septal wall thickness. Mildly increased left ventricular systolic volume.  

 Mildly decreased left ventricular ejection fraction. Mildly reduced global left  

 ventricular systolic function. Grade 1 diastolic dysfunction. 

 

 Right Ventricle 

 Mild right ventricular dilatation. Mild pulmonary hypertension. 

 

 Right Atrium 

 Mild right atrial dilatation. 

 

 Left Atrium 

 Mild left atrial dilatation. 

 

 Mitral Valve 

 Structurally normal mitral valve. Mitral annular calcification. Mild mitral  

 regurgitation. 

 

 Aortic Valve 

 Trileaflet aortic valve. Mild aortic stenosis with a peak gradient of 22 mmHg  

 and a mean gradient of 11mmHg. 

 

 Tricuspid Valve 

 Structurally normal tricuspid valve. Mild tricuspid regurgitation. No tricuspid  

 stenosis. 

 

 Pulmonic Valve 

 Pulmonic valve not well visualized. Trace pulmonic regurgitation. No pulmonic  

 stenosis. 

 

 Pericardium 

 No pericardial or pleural effusion. 

 

 Aorta 

 Mild aortic dilatation at the level of the sinuses of valsalva (root). 

 

 CONCLUSIONS 

 Diagnosis shortness of breath, assess LV function 

 

 Reduced LV systolic function with increased LV mass 

 RV enlargement 

 Biatrial enlargement 

 Mild aortic stenosis 

 Previewed by:  

 Dr. Aries Agee MD 

 (Electronically Signed) 

 Final Date:      2024 10:20

## 2024-09-26 NOTE — P.PN
Subjective


Progress Note Date: 09/26/24








This is a 59-year-old male patient who got transferred to the ICU because of 

hypotension.  The patient is admitted currently for small bowel obstruction.  

The patient is known to have end-stage renal disease on hemodialysis and his 

last hemodialysis session was yesterday.  He gets dialyzed MWF.  He has had 

previous kidney transplantation and has essentially failed.  He has had previous

surgeries in his abdomen for transplantation.  He presented to the hospital 

because of abdominal pain, cramping, nausea and emesis and his last bowel 

movement was around 4 days ago.  CAT scan of the abdomen showed dilated small 

bowel consistent with ileus/small bowel obstruction.  General surgery is on the 

case.  Because of ongoing discomfort, NG tube was inserted and immediately 

approximately 2 L of gastric material was aspirated.  Subsequently patient 

became hypotensive.  He starts cardiac blood pressure is was in the mid 70s.  He

got transferred to the ICU.  Given half a liter of normal saline and the current

blood pressure is 113/61.  He is awake and alert.  He is currently on room air 

oxygen with a pulse ox of 99%.  No significant tachycardia.  No reported fever. 

The white cell count is at 6.2 with a hemoglobin of 11.5 and a platelet count of

166.  BUN 65 with a creatinine of 9.7 and the patient's potassium level is at 

5.8 and a sodium level of 136.  Amylase and lipase are within normal limits.  

LFTs are within normal limits.  CAT scan of the abdomen also showed a loculated 

pleural effusion in the left lung base above the left hemidiaphragm with a 

calcified rim which is a sequelae of previous pneumonia/parapneumonic effusion. 

He has another calcified rim collection in the mid pelvis.  Denies having any 

cough or sputum production.  No significant respiratory distress.











(Done 4 hours during the 9/26/2024, the patient is being seen for a follow-up.  

NG tube is still in place and the patient denies having any significant nausea 

or vomiting and no significant abdominal pains on today's evaluation.  The 

patient has produced approximately 3.3 L of gastric output since insertion of 

the NG tube.  Currently, IV fluids are at KVO.  The patient underwent 

hemodialysis yesterday with ultrafiltration of 500 cc of fluids.  No fever.  No 

chills.  No tachycardia.  He did encounter a run of SVT yesterday and based on 

that, the patient was started on a Cardizem drip which is running at 10 mg an 

hour.  Currently, he is in normal sinus rhythm.  Cardiology has been consulted. 

The white cell count is at 5 with a hemoglobin 11.3 and a platelet count of 153.

 Sodium is at 136, potassium is at 5.3, BUN is 49 with a creatinine of 7.9 and 

the patient serum bicarb is at 23.  LFTs are all within normal limits.  Amylase 

and lipase are also within normal limits.  A follow-up small bowel x-ray that 

was done today showed limited examination with no contrast seen in the colon.  

Bowel obstruction is likely still present.  The patient has no altered 

mentation.  Communicating.  Denies having any specific complaints.Echocardiogram

was completed yesterday and the patient was found to have LV ejection fraction 

of 45 to 50%.  There was also mild increase in the septal wall thickness.  

Increase in the left ventricular volume systolic.  No significant valvular 

abnormalities other than some mild degree of aortic stenosis.





Objective





- Vital Signs


Vital signs: 


                                   Vital Signs











Temp  98.6 F   09/26/24 04:00


 


Pulse  80   09/26/24 07:00


 


Resp  22   09/26/24 07:00


 


BP  92/55   09/26/24 07:00


 


Pulse Ox  92 L  09/26/24 07:00


 


FiO2      








                                 Intake & Output











 09/25/24 09/26/24 09/26/24





 18:59 06:59 18:59


 


Intake Total 500 750 


 


Output Total 2500 1600 


 


Balance -2000 -850 


 


Weight  81.2 kg 


 


Intake:   


 


    


 


    Sodium Chloride 0.9% 500 500  





    ml 500 ml @ 999 mls/hr IV   





    .Q31M STA Rx#:[id number]   


 


  Hemodialysis  750 


 


Output:   


 


  Gastric Drainage 2500 850 


 


  Urine 0 0 


 


  Hemodialysis  500 


 


  Hemodialysis Net Amount  250 














- Exam














- Constitutional


General appearance: no acute distress





- EENT


Eyes: EOMI


Ears: negative: bulging, bullous, dull, erythema, fluid, myringotomy tube, 

obstructed by cerumen, scarring, unable to vistualize, other





- Neck


Neck: no lymphadenopathy


Carotids: negative: upstroke normal, upstroke delayed, upstroke diminished, 

upstroke bounding, bruit absent, bruit present


Thyroid: negative: normal size, enlarged, firm, nodule





- Respiratory


Respiratory: negative: CTA, diminished, dullness, rales, rhonchi, wheezing, 

prolonged expiration, prolonged inspiration, other





- Cardiovascular


Rhythm: regular


Heart sounds: abnormal: S1, S2





- Gastrointestinal


General gastrointestinal: absent bowel sounds





- Integumentary


Integumentary: normal, normal turgor





- Neurologic


Neurologic: CNII-XII intact





- Musculoskeletal


Musculoskeletal: gait normal





- Psychiatric


Psychiatric: A&O x's 3 (Soft, well-healed surgical scarring, nondistended, no 

rebound or guarding, tenderness to palpation in bilateral lower quadrants)








- Labs


CBC & Chem 7: 


                                 09/26/24 06:09





                                 09/26/24 06:09


Labs: 


                  Abnormal Lab Results - Last 24 Hours (Table)











  09/25/24 09/25/24 09/25/24 Range/Units





  03:14 03:14 17:06 


 


RBC  3.85 L    (4.10-5.60)  X 10*6/uL


 


Hgb  11.5 L    (12.0-17.0)  g/dL


 


Hct  35.9 L    (37.2-50.0)  %


 


RDW  14.7 H    (11.5-14.5)  %


 


Lymphocytes #  0.56 L    (0.90-5.00)  X 10*3/uL


 


Eosinophils #  0.01 L    (0.04-0.35)  X 10*3/uL


 


Sodium    136 L  (137-145)  mmol/L


 


Potassium   5.7 H  5.8 H  (3.5-5.5)  mmol/L


 


Chloride   91 L  88 L  ()  mmol/L


 


Anion Gap   19.70 H   (4.00-12.00)  mmol/L


 


BUN   46.8 H  65 H  (9.0-27.0)  mg/dL


 


Creatinine   7.9 A*  9.74 H*  (0.6-1.5)  mg/dL


 


Est GFR (CKD-EPI)   7 L   (>=60)   


 


BUN/Creatinine Ratio   5.92 L   (12.00-20.00)  Ratio


 


Glucose   126 H  153 H  ()  mg/dL


 


Total Bilirubin    1.8 H  (0.2-1.3)  mg/dL


 


AST   11 L  15 L  (13-35)  U/L


 


Albumin/Globulin Ratio   1.56 L   (1.60-3.17)  Ratio














  09/25/24 09/26/24 Range/Units





  21:51 06:09 


 


RBC   3.72 L  (4.10-5.60)  X 10*6/uL


 


Hgb   11.3 L D  (12.0-17.0)  g/dL


 


Hct   35.7 L  (37.2-50.0)  %


 


RDW    (11.5-14.5)  %


 


Lymphocytes #   0.6 L  (0.90-5.00)  X 10*3/uL


 


Eosinophils #    (0.04-0.35)  X 10*3/uL


 


Sodium  135 L   (137-145)  mmol/L


 


Potassium    (3.5-5.5)  mmol/L


 


Chloride  91 L   ()  mmol/L


 


Anion Gap    (4.00-12.00)  mmol/L


 


BUN  42 H   (9.0-27.0)  mg/dL


 


Creatinine  6.88 H   (0.6-1.5)  mg/dL


 


Est GFR (CKD-EPI)    (>=60)   


 


BUN/Creatinine Ratio    (12.00-20.00)  Ratio


 


Glucose  104 H   ()  mg/dL


 


Total Bilirubin    (0.2-1.3)  mg/dL


 


AST    (13-35)  U/L


 


Albumin/Globulin Ratio    (1.60-3.17)  Ratio














Assessment and Plan


Plan: 








Small bowel obstruction/ileus probably due to abdominal adhesions the patient 

has undergone previous abdominal surgeries for renal transplantation.  Patient 

is status post NG tube decompression with evacuation of approximately 3.5 L of 

gastric material.  The patient's NG tube remains in place.  No nausea or 

vomiting or abdominal pain.  General surgery is on the case.  Hemodynamically 

stable.





Hypovolemic shock with secondary hypotension, responded to IV fluids.





End-stage renal disease on hemodialysis 3 times a week, MWF, underwent a short 

session of hemodialysis yesterday with a ultrafiltration of 500 cc.





Mild hyperkalemia related to end-stage renal disease





History of kidney transplantation x 2





History of hepatitis B





Hypertension, history of





Hyperlipidemia





COPD





Chronic pleural fluid and mid abdominal fluid collection with a calcified rim.  

The first is most likely a sequela of a previous pneumonia and parapneumonic 

effusion.  The second is most likely related to previous abdominal surgeries.  

Those are benign findings SVT, currently back into normal sinus rhythm and the 

patient is currently on Cardizem drip at 10 mg an hour.





CHF with mild impairment of LV function and mild aortic stenosis.





Plan





Keep the patient n.p.o.


Keep the NG tube in place


X-ray of the abdomen was noted


General surgery consultation


Keep n.p.o.


No need for pressors at the patient is hemodynamically stable 


wean down Cardizem to 5 mg an hour. 


Consult cardiology


Monitor electrolytes


Lactic acid level is not elevated


Nephrology consultation


Will continue to follow

## 2024-09-26 NOTE — HP
HISTORY AND PHYSICAL



CHIEF COMPLAINT:

Abdominal pain and vomiting.



HISTORY OF PRESENT ILLNESS:

This is a 59-year-old gentleman with past medical history of multiple medical problems

including end-stage renal disease, on hemodialysis, complaining of nausea, vomiting,

and abdominal distention.  The patient is being evaluated by Surgery and Nephrology at

this time.  The possibility of small bowel obstruction versus partial ileus is being

considered.  Small-bowel follow-through was being recommended at this time.  No chest

pain.  No palpitation.



PAST MEDICAL HISTORY:

Reviewed. End-stage renal disease, on hemodialysis, hypertension, hyperlipidemia, rest

of the history and rest of the chart is also reviewed.



HOME MEDICATIONS:

Reviewed include vitamin B complex, dose and rest of medications reviewed.



ALLERGIES:

None.



FAMILY HISTORY:

No history of heart disease, or strokes in the family.



SOCIAL HISTORY:

Previous history of smoking.



REVIEW OF SYSTEMS:

Fourteen-point review is negative except as mentioned earlier.



PHYSICAL EXAMINATION:

VITAL SIGNS: Pulse is 101, blood pressure 88/54, respirations 18.

CHEST: A few scattered rhonchi and crackles.

ABDOMEN:  Soft, mild diffuse distention. Mild diffuse tenderness.

LEGS: No edema.

NERVOUS SYSTEM: Nonfocal.



LABORATORY DATA:

Reviewed. Potassium 5.7. CT scan noted.



ASSESSMENT:

1. Abdominal distention, possibly small-bowel obstruction versus ileus.

2. End-stage renal disease, on hemodialysis.

3. Hypertension.

4. Hyperlipidemia.

5. History of left kidney transplant.



RECOMMENDATIONS AND DISCUSSION:

In this 59-year-old gentleman, who presented with multiple complex medical issues, we

will monitor the patient closely. Continue current management and continue symptomatic

treatment, pain management.  Await small-bowel follow-through. Closely follow with

Surgery. Guarded prognosis. Further recommendations to follow.  See orders for further

details.





MMODL / IJN: [id number] / Job#: [id number]

## 2024-09-27 LAB
ALBUMIN SERPL-MCNC: 3.6 G/DL (ref 3.5–5)
ALP SERPL-CCNC: 85 U/L (ref 38–126)
ALT SERPL-CCNC: 9 U/L (ref 4–49)
ANION GAP SERPL CALC-SCNC: 17 MMOL/L
AST SERPL-CCNC: 15 U/L (ref 17–59)
BASOPHILS # BLD AUTO: 0 K/UL (ref 0–0.2)
BASOPHILS NFR BLD AUTO: 0 %
BUN SERPL-SCNC: 63 MG/DL (ref 9–20)
CALCIUM SPEC-MCNC: 9.1 MG/DL (ref 8.4–10.2)
CHLORIDE SERPL-SCNC: 93 MMOL/L (ref 98–107)
CO2 SERPL-SCNC: 25 MMOL/L (ref 22–30)
EOSINOPHIL # BLD AUTO: 0.1 K/UL (ref 0–0.7)
EOSINOPHIL NFR BLD AUTO: 1 %
ERYTHROCYTE [DISTWIDTH] IN BLOOD BY AUTOMATED COUNT: 3.68 M/UL (ref 4.3–5.9)
ERYTHROCYTE [DISTWIDTH] IN BLOOD: 14.5 % (ref 11.5–15.5)
GLUCOSE SERPL-MCNC: 84 MG/DL (ref 74–99)
HCT VFR BLD AUTO: 35.1 % (ref 39–53)
HGB BLD-MCNC: 11.3 GM/DL (ref 13–17.5)
LYMPHOCYTES # SPEC AUTO: 0.6 K/UL (ref 1–4.8)
LYMPHOCYTES NFR SPEC AUTO: 12 %
MCH RBC QN AUTO: 30.6 PG (ref 25–35)
MCHC RBC AUTO-ENTMCNC: 32.1 G/DL (ref 31–37)
MCV RBC AUTO: 95.5 FL (ref 80–100)
MONOCYTES # BLD AUTO: 0.3 K/UL (ref 0–1)
MONOCYTES NFR BLD AUTO: 7 %
NEUTROPHILS # BLD AUTO: 3.7 K/UL (ref 1.3–7.7)
NEUTROPHILS NFR BLD AUTO: 77 %
PLATELET # BLD AUTO: 173 K/UL (ref 150–450)
POTASSIUM SERPL-SCNC: 5.7 MMOL/L (ref 3.5–5.1)
PROT SERPL-MCNC: 6.3 G/DL (ref 6.3–8.2)
SODIUM SERPL-SCNC: 135 MMOL/L (ref 137–145)
WBC # BLD AUTO: 4.8 K/UL (ref 3.8–10.6)

## 2024-09-27 RX ADMIN — ATORVASTATIN CALCIUM SCH MG: 40 TABLET, FILM COATED ORAL at 08:49

## 2024-09-27 RX ADMIN — METOPROLOL TARTRATE SCH MG: 25 TABLET, FILM COATED ORAL at 08:51

## 2024-09-27 NOTE — P.PN
Subjective


Progress Note Date: 09/27/24


Evaluated patient today in the intensive care unit resting in bed. Not reporting

any specific abdominal pain with the exception of some mild tenderness with 

palpation.  NG tube was placed yesterday with 2L of gastric output. Ended up in 

the ICU secondary to hypotension. NG tube currently in place and patient is NPO.

He is not passing gas. Underwent small bowel follow through this AM showing 

limited exam terminated at 330 minutes with no contrast seen within the colon. 

Bowel obstruction in the differential diagnosis, correlate for constipation. 

Sodium 136, potassium 5.3, BUN 49, creatinine 7.91. Was placed on IV cardizem 

overnight due to episode of SVT. Remains on cardizem running at 10 mls/hr. On n

ormal saline at 75 mls/hr. 





09/27/2024


Patient is evaluated in follow-up remains in the intensive care unit he has been

downgraded to the medical floor pending a bed.  Patient states that he is passi

ng a lot of gas but has not had a bowel movement yet.  Abdominal x-ray today 

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 

artery with wide area within the mid abdomen.  Patient remains n.p.o. at this 

time.  He is currently undergoing hemodialysis per nephrology.  Cardizem has 

been discontinued patient remains in normal sinus rhythm.





Review of Systems





Constitutional: Denied any fatigue denied any fever.


Cardio vascular: denied any chest pain, palpitations


Gastrointestinal: denied any nausea, vomiting, diarrhea


Pulmonary: Denied any shortness of breath cough


Neurologic denied any new focal deficits





All inpatient medications were reviewed and appropriate changes in these 

medications as dictated in the interval history and assessment and plan.





PHYSICAL EXAMINATION: 





GENERAL: The patient is alert and oriented x3, not in any acute distress. Well 

developed, well nourished. 


HEENT: Pupils are round and equally reacting to light. EOMI. No scleral icterus.

No conjunctival pallor. Normocephalic, atraumatic. No pharyngeal erythema. No 

thyromegaly. 


CARDIOVASCULAR: S1 and S2 present. No murmurs, rubs, or gallops. 


PULMONARY: Chest is clear to auscultation, no wheezing or crackles. 


ABDOMEN: Soft, nontender, nondistended, normoactive bowel sounds. No palpable 

organomegaly. NG tube in place 


MUSCULOSKELETAL: No joint swelling or deformity.


EXTREMITIES: No cyanosis, clubbing, or pedal edema. 


NEUROLOGICAL: Gross neurological examination did not reveal any focal deficits. 


SKIN: No rashes. 





Assessment


Small bowel obstruction vs. Ileus; NG tube in place patient remains NPO, General

surgery following


End stage renal disease maintained on hemodialysis 


Episode of SVT


Hyperkalemia 


Chronic pleural effusion 


Hx of left kidney transplant


Hepatitis B


COPD with no acute exacerbation


Gastroesophageal reflux disease


Hypertension


Hyperlipidemia 


Former Smoker 


GI prophylaxis


DVT prophylaxis


Full Code





Plan 


Continue NG tube 


NPO diet and bowel rest


IV cardizem has been discontinued patient has been placed on oral metoprolol


General surgery following


Nephrology following, continue hemodialysis MWF


Repeat blood work in the AM. 


Continue to monitor patient closely in the ICU has been downgrade to the medical

floor. 





The impression and plan of care has been dictated by Oumou Muhammad Nurse 

Practitioner as directed.





Dr. Jessica MD


I have performed a history and physical examination and medical decision making 

of this patient, discussed the same with the dictator, and agree with the 

dictators assessment and plan as written, documented as a scribe. Based on total

visit time, I have performed more than 50% of this visit.











Objective





- Vital Signs


Vital signs: 


                                   Vital Signs











Temp  98.0 F   09/27/24 06:00


 


Pulse  73   09/27/24 08:08


 


Resp  20   09/27/24 07:00


 


BP  103/58   09/27/24 07:00


 


Pulse Ox  92 L  09/27/24 07:00


 


FiO2      








                                 Intake & Output











 09/26/24 09/27/24 09/27/24





 18:59 06:59 18:59


 


Intake Total 800 900 75


 


Output Total 150 300 


 


Balance 650 600 75


 


Weight  80.7 kg 


 


Intake:   


 


   900 75


 


    Sodium Chloride 0.9% 1, 375 900 75





    000 ml @ 75 mls/hr IV .   





    [id number] ATUL Rx#:[id number]   


 


  Intake, IV Titration 425  





  Amount   


 


    Diltiazem 125 mg In 125  





    Sodium Chloride 0.9% 100   





    ml @ 10 MG/HR 10 mls/hr   





    IV .[id number] ATUL Rx#:   





    [id number]   


 


    Sodium Chloride 0.9% 1, 300  





    000 ml @ 75 mls/hr IV .   





    [id number] ATUL Rx#:[id number]   


 


Output:   


 


  Gastric Drainage 150 300 


 


  Urine 0 0 


 


Other:   


 


  # Bowel Movements  0 














- Labs


CBC & Chem 7: 


                                 09/29/24 08:03





                                 09/29/24 08:03


Labs: 


                  Abnormal Lab Results - Last 24 Hours (Table)











  09/26/24 09/27/24 09/27/24 Range/Units





  06:09 02:45 02:45 


 


RBC   3.68 L   (4.30-5.90)  m/uL


 


Hgb   11.3 L   (13.0-17.5)  gm/dL


 


Hct   35.1 L   (39.0-53.0)  %


 


Lymphocytes #   0.6 L   (1.0-4.8)  k/uL


 


Sodium  136 L   135 L  (137-145)  mmol/L


 


Potassium  5.3 H   5.7 H  (3.5-5.1)  mmol/L


 


Chloride  92 L   93 L  ()  mmol/L


 


BUN  49 H   63 H  (9-20)  mg/dL


 


Creatinine  7.91 H*   9.76 H*  (0.66-1.25)  mg/dL


 


Glucose  104 H    (74-99)  mg/dL


 


AST    15 L  (17-59)  U/L














Assessment and Plan


Time with Patient: Less than 30

## 2024-09-27 NOTE — P.PN
Subjective





Patient seen and evaluated at bedside. Denies abdominal pain, passing flatus. 

Denies nausea or vomiting.  





Objective





- Vital Signs


Vital signs: 


                                   Vital Signs











Temp  97.8 F   09/27/24 13:25


 


Pulse  70   09/27/24 14:00


 


Resp  14   09/27/24 14:00


 


BP  103/56   09/27/24 14:00


 


Pulse Ox  100   09/27/24 14:00


 


FiO2      








                                 Intake & Output











 09/26/24 09/27/24 09/27/24





 18:59 06:59 18:59


 


Intake Total 


 


Output Total 150 300 400


 


Balance 650 600 700


 


Weight  80.7 kg 


 


Intake:   


 


   900 600


 


    Sodium Chloride 0.9% 1, 375 900 600





    000 ml @ 75 mls/hr IV .   





    [id number] ATUL Rx#:[id number]   


 


  Intake, IV Titration 425  





  Amount   


 


    Diltiazem 125 mg In 125  





    Sodium Chloride 0.9% 100   





    ml @ 10 MG/HR 10 mls/hr   





    IV .[id number] ATUL Rx#:   





    [id number]   


 


    Sodium Chloride 0.9% 1, 300  





    000 ml @ 75 mls/hr IV .   





    [id number] ATUL Rx#:[id number]   


 


  Oral   100


 


  Hemodialysis   400


 


Output:   


 


  Gastric Drainage 150 300 


 


  Urine 0 0 0


 


  Hemodialysis   400


 


  Hemodialysis Net Amount   0


 


Other:   


 


  # Bowel Movements  0 0














- Labs


CBC & Chem 7: 


                                 09/27/24 02:45





                                 09/27/24 02:45


Labs: 


                  Abnormal Lab Results - Last 24 Hours (Table)











  09/27/24 09/27/24 Range/Units





  02:45 02:45 


 


RBC  3.68 L   (4.30-5.90)  m/uL


 


Hgb  11.3 L   (13.0-17.5)  gm/dL


 


Hct  35.1 L   (39.0-53.0)  %


 


Lymphocytes #  0.6 L   (1.0-4.8)  k/uL


 


Sodium   135 L  (137-145)  mmol/L


 


Potassium   5.7 H  (3.5-5.1)  mmol/L


 


Chloride   93 L  ()  mmol/L


 


BUN   63 H  (9-20)  mg/dL


 


Creatinine   9.76 H*  (0.66-1.25)  mg/dL


 


AST   15 L  (17-59)  U/L














Assessment and Plan


Assessment: 


58 yo male with ileus vs small bowel obstruction


-we will continue medical management


-passing gas, we will attempt clamping of nasogastric tube again as patient is 

clinically doing well





Time with Patient: Less than 30

## 2024-09-27 NOTE — XR
EXAMINATION TYPE: XR abdomen 2V

 

DATE OF EXAM: 9/27/2024

 

COMPARISON: 9/25/2024

 

INDICATION: Obstruction

 

TECHNIQUE: Single view abdomen supine and upright views

 

FINDINGS:  

There is a normal bowel gas pattern. Nonspecific small bowel gas is present.

Psoas margins are normal.

No organomegaly is present.

There is a large calcified area within the mid abdomen. As present on the comparison CT. Some aneurys
mal dilatation of the right common iliac artery of 3.1 cm.

 

Nasogastric tube tip in the proximal left upper quadrant of the abdomen. Splenic artery calcification
s present.

 

IMPRESSION: 

1. Unremarkable Abdomen

2. Aneurysmal dilatation of the right common iliac artery.

3. Calcified area within the mid abdomen

 

[facility] Janae Lewis, Workstation: [id number], 9/27/2024 12:39 PM

## 2024-09-27 NOTE — P.PN
Subjective


Progress Note Date: 09/27/24








This is a 59-year-old male patient who got transferred to the ICU because of 

hypotension.  The patient is admitted currently for small bowel obstruction.  

The patient is known to have end-stage renal disease on hemodialysis and his 

last hemodialysis session was yesterday.  He gets dialyzed MWF.  He has had 

previous kidney transplantation and has essentially failed.  He has had previous

surgeries in his abdomen for transplantation.  He presented to the hospital 

because of abdominal pain, cramping, nausea and emesis and his last bowel 

movement was around 4 days ago.  CAT scan of the abdomen showed dilated small 

bowel consistent with ileus/small bowel obstruction.  General surgery is on the 

case.  Because of ongoing discomfort, NG tube was inserted and immediately 

approximately 2 L of gastric material was aspirated.  Subsequently patient 

became hypotensive.  He starts cardiac blood pressure is was in the mid 70s.  He

got transferred to the ICU.  Given half a liter of normal saline and the current

blood pressure is 113/61.  He is awake and alert.  He is currently on room air 

oxygen with a pulse ox of 99%.  No significant tachycardia.  No reported fever. 

The white cell count is at 6.2 with a hemoglobin of 11.5 and a platelet count of

166.  BUN 65 with a creatinine of 9.7 and the patient's potassium level is at 

5.8 and a sodium level of 136.  Amylase and lipase are within normal limits.  

LFTs are within normal limits.  CAT scan of the abdomen also showed a loculated 

pleural effusion in the left lung base above the left hemidiaphragm with a 

calcified rim which is a sequelae of previous pneumonia/parapneumonic effusion. 

He has another calcified rim collection in the mid pelvis.  Denies having any 

cough or sputum production.  No significant respiratory distress.











(Done 4 hours during the 9/26/2024, the patient is being seen for a follow-up.  

NG tube is still in place and the patient denies having any significant nausea 

or vomiting and no significant abdominal pains on today's evaluation.  The 

patient has produced approximately 3.3 L of gastric output since insertion of 

the NG tube.  Currently, IV fluids are at KVO.  The patient underwent 

hemodialysis yesterday with ultrafiltration of 500 cc of fluids.  No fever.  No 

chills.  No tachycardia.  He did encounter a run of SVT yesterday and based on 

that, the patient was started on a Cardizem drip which is running at 10 mg an 

hour.  Currently, he is in normal sinus rhythm.  Cardiology has been consulted. 

The white cell count is at 5 with a hemoglobin 11.3 and a platelet count of 153.

 Sodium is at 136, potassium is at 5.3, BUN is 49 with a creatinine of 7.9 and 

the patient serum bicarb is at 23.  LFTs are all within normal limits.  Amylase 

and lipase are also within normal limits.  A follow-up small bowel x-ray that 

was done today showed limited examination with no contrast seen in the colon.  

Bowel obstruction is likely still present.  The patient has no altered 

mentation.  Communicating.  Denies having any specific complaints.Echocardiogram

was completed yesterday and the patient was found to have LV ejection fraction 

of 45 to 50%.  There was also mild increase in the septal wall thickness.  

Increase in the left ventricular volume systolic.  No significant valvular 

abnormalities other than some mild degree of aortic stenosis.





On 9/27/2024, the patient is being seen for a follow-up.  The patient is doing 

well with no specific complaints.  No nausea vomiting or abdominal pain.  The 

patient is passing flatus.  NG tube has been clamped.  The patient is also 

taking some popsicles and ice chips.  Otherwise, hemodynamically stable.  No 

angina or palpitation.  No shortness of breath.  No chest pain.  The white cell 

count is at 4.8 with hemoglobin of 11.3 and a platelet count of 173.  BUN 63 

with a creatinine of 9.7 and the patient's potassium level is at 5.7.  The 

patient is going to undergo another session of hemodialysis today.  No other 

significant events overnight.  Respiratory status is stable and the patient is 

currently on room air oxygen.  No other significant events overnight.  First t

festus of the abdomen was done today and it showed an unremarkable abdomen.  

Aneurysmal dilatation of the right common iliac artery was noted.





Objective





- Vital Signs


Vital signs: 


                                   Vital Signs











Temp  98.0 F   09/27/24 06:00


 


Pulse  73   09/27/24 08:08


 


Resp  20   09/27/24 07:00


 


BP  103/58   09/27/24 07:00


 


Pulse Ox  92 L  09/27/24 07:00


 


FiO2      








                                 Intake & Output











 09/26/24 09/27/24 09/27/24





 18:59 06:59 18:59


 


Intake Total 800 900 75


 


Output Total 150 300 


 


Balance 650 600 75


 


Weight  80.7 kg 


 


Intake:   


 


   900 75


 


    Sodium Chloride 0.9% 1, 375 900 75





    000 ml @ 75 mls/hr IV .   





    [id number] [facility] Rx#:[id number]   


 


  Intake, IV Titration 425  





  Amount   


 


    Diltiazem 125 mg In 125  





    Sodium Chloride 0.9% 100   





    ml @ 10 MG/HR 10 mls/hr   





    IV .[id number] [facility] Rx#:   





    [id number]   


 


    Sodium Chloride 0.9% 1, 300  





    000 ml @ 75 mls/hr IV .   





    [id number] [facility] Rx#:[id number]   


 


Output:   


 


  Gastric Drainage 150 300 


 


  Urine 0 0 


 


Other:   


 


  # Bowel Movements  0 














- Exam














- Constitutional


General appearance: no acute distress, NG tube still in place.  NG tube is 

currently clamped.





- EENT


Eyes: EOMI


Ears: negative: bulging, bullous, dull, erythema, fluid, myringotomy tube, 

obstructed by cerumen, scarring, unable to vistualize, other





- Neck


Neck: no lymphadenopathy


Carotids: negative: upstroke normal, upstroke delayed, upstroke diminished, 

upstroke bounding, bruit absent, bruit present


Thyroid: negative: normal size, enlarged, firm, nodule





- Respiratory


Respiratory: negative: CTA, diminished, dullness, rales, rhonchi, wheezing, 

prolonged expiration, prolonged inspiration, other





- Cardiovascular


Rhythm: regular


Heart sounds: abnormal: S1, S2





- Gastrointestinal


General gastrointestinal: absent bowel sounds





- Integumentary


Integumentary: normal, normal turgor





- Neurologic


Neurologic: CNII-XII intact





- Musculoskeletal


Musculoskeletal: gait normal





- Psychiatric


Psychiatric: A&O x's 3 (Soft, well-healed surgical scarring, nondistended, no 

rebound or guarding, tenderness to palpation in bilateral lower quadrants)








- Labs


CBC & Chem 7: 


                                 09/27/24 02:45





                                 09/27/24 02:45


Labs: 


                  Abnormal Lab Results - Last 24 Hours (Table)











  09/26/24 09/27/24 09/27/24 Range/Units





  06:09 02:45 02:45 


 


RBC   3.68 L   (4.30-5.90)  m/uL


 


Hgb   11.3 L   (13.0-17.5)  gm/dL


 


Hct   35.1 L   (39.0-53.0)  %


 


Lymphocytes #   0.6 L   (1.0-4.8)  k/uL


 


Sodium  136 L   135 L  (137-145)  mmol/L


 


Potassium  5.3 H   5.7 H  (3.5-5.1)  mmol/L


 


Chloride  92 L   93 L  ()  mmol/L


 


BUN  49 H   63 H  (9-20)  mg/dL


 


Creatinine  7.91 H*   9.76 H*  (0.66-1.25)  mg/dL


 


Glucose  104 H    (74-99)  mg/dL


 


AST    15 L  (17-59)  U/L














Assessment and Plan


Plan: 








Small bowel obstruction/ileus probably due to abdominal adhesions the patient 

has undergone previous abdominal surgeries for renal transplantation.  Patient 

is status post NG tube decompression with evacuation of approximately 3.5 L of 

gastric material.  The patient's NG tube remains in place.  No nausea or 

vomiting or abdominal pain.  General surgery is on the case.  Hemodynamically 

stable.  The NG tube output is minimal and the patient is producing flatus.  X-

ray of the abdomen today shows nonspecific and unremarkable findings.





Hypovolemic shock with secondary hypotension, responded to IV fluids.  The 

patient is currently normotensive





End-stage renal disease on hemodialysis 3 times a week, MWF, underwent a short 

session of hemodialysis on 9/25/2024 with a ultrafiltration of 500 cc.





Mild hyperkalemia related to end-stage renal disease





History of kidney transplantation x 2





History of hepatitis B





Hypertension, history of





Hyperlipidemia





COPD





Chronic pleural fluid and mid abdominal fluid collection with a calcified rim.  

The first is most likely a sequela of a previous pneumonia and parapneumonic 

effusion.  The second is most likely related to previous abdominal surgeries.  

Those are benign findings SVT, currently back into normal sinus rhythm and the 

patient is currently on Cardizem drip at 10 mg an hour.





CHF with mild impairment of LV function and mild aortic stenosis.





Plan





Keep the patient n.p.o.


NG tube has been clamped.  If able to tolerate the clamping of the NG tube for 

the next few hours, we will going to remove the NG tube


X-ray of the abdomen was noted


General surgery consultation


Hemodialysis to be done today


Consult cardiology


Monitor electrolytes


Lactic acid level is not elevated


Nephrology consultation


Will continue to follow

## 2024-09-27 NOTE — P.PN
Subjective


Progress Note Date: 09/27/24





PROGRESS NOTE


The patient is a 59-year-old male, end-stage renal disease status post 

transplant that failed his on hemodialysis and presented with abdominal 

discomfort and small bowel obstruction.  He was transferred to the ICU because 

of hypotension.  He had a run of SVT and cardiology consultation was requested. 

He is in sinus mechanism at this time on IV Cardizem.  He is hemodynamically 

stable.  He denies any chest discomfort, dizziness or palpitations.  He has no 

significant dyspnea.  He has a history of COPD.  He is followed by cardiologist 

at [facility] and had a prior stenting about 5 years ago and has a 

history of peripheral vascular disease with femorofemoral bypass.  He has no 

clear PND, orthopnea or peripheral edema.  He cannot recall a prior documented 

history of malignant arrhythmia.  His ejection fraction estimation is not 

available to me.  He has an NG tube and feels better with minimal flatus but 

improvement in his abdominal discomfort.  He has no history of smoking.  He had 

a history of renal disease at a young age.  He has no urinary output.  He has a 

history of hyperlipidemia, he is nondiabetic.


September 27:


The patient feels better today, his abdominal pain is better.  He denies any 

chest discomfort, dizziness or palpitations.  He continues to be in sinus 

mechanism.  He continues to have an NG tube.  He is on IV Cardizem.  He 

ambulated yesterday without significant difficulties.  His echocardiogram showed

an ejection fraction of 45 to 50% with mild aortic stenosis and mild mitral 

regurgitation.





Medications:


IV Cardizem, aspirin, midodrine, pravastatin 40 mg daily


PHYSICAL EXAMINATION:


Blood pressure 103/60 heart rate 72


LUNGS: Clear to auscultation


HEART: Regular rate and rhythm, S1, S2. No S3.  Systolic ejection murmur at the 

base with a holosystolic murmur at the apex, 2/6


ABDOMEN: Soft, nontender, no organomegaly, positive bowel sounds


EXTREMETIES: No edema





LAB:


Hemoglobin 11.3, BUN 63, creatinine 9.76.


IMPRESSION:


1.  Small bowel obstruction versus ileus, improving


2.  History of CAD with no evidence of ACS


3.  1 episode of SVT, resolved


4.  History of peripheral vascular disease status post femorofemoral bypass


5.  End-stage renal disease, post transplant


6.  Hyperlipidemia





PLAN:


1.  Once able to take oral medication start metoprolol 25 mg twice a day


2.  Dialysis per nephrology


3.  Change pravastatin to atorvastatin in view of cardiac history


4.  Depending on his progress further recommendations will be made





 








Objective





- Vital Signs


Vital signs: 


                                   Vital Signs











Temp  98.0 F   09/27/24 06:00


 


Pulse  72   09/27/24 07:00


 


Resp  20   09/27/24 07:00


 


BP  103/58   09/27/24 07:00


 


Pulse Ox  92 L  09/27/24 07:00


 


FiO2      








                                 Intake & Output











 09/26/24 09/27/24 09/27/24





 18:59 06:59 18:59


 


Intake Total 800 900 75


 


Output Total 150 300 


 


Balance 650 600 75


 


Weight  80.7 kg 


 


Intake:   


 


   900 75


 


    Sodium Chloride 0.9% 1, 375 900 75





    000 ml @ 75 mls/hr IV .   





    [id number] [facility] Rx#:[id number]   


 


  Intake, IV Titration 425  





  Amount   


 


    Diltiazem 125 mg In 125  





    Sodium Chloride 0.9% 100   





    ml @ 10 MG/HR 10 mls/hr   





    IV .[id number] [facility] Rx#:   





    [id number]   


 


    Sodium Chloride 0.9% 1, 300  





    000 ml @ 75 mls/hr IV .   





    [id number] [facility] Rx#:[id number]   


 


Output:   


 


  Gastric Drainage 150 300 


 


  Urine 0 0 


 


Other:   


 


  # Bowel Movements  0 














- Labs


CBC & Chem 7: 


                                 09/27/24 02:45





                                 09/27/24 02:45


Labs: 


                  Abnormal Lab Results - Last 24 Hours (Table)











  09/26/24 09/27/24 09/27/24 Range/Units





  06:09 02:45 02:45 


 


RBC   3.68 L   (4.30-5.90)  m/uL


 


Hgb   11.3 L   (13.0-17.5)  gm/dL


 


Hct   35.1 L   (39.0-53.0)  %


 


Lymphocytes #   0.6 L   (1.0-4.8)  k/uL


 


Sodium  136 L   135 L  (137-145)  mmol/L


 


Potassium  5.3 H   5.7 H  (3.5-5.1)  mmol/L


 


Chloride  92 L   93 L  ()  mmol/L


 


BUN  49 H   63 H  (9-20)  mg/dL


 


Creatinine  7.91 H*   9.76 H*  (0.66-1.25)  mg/dL


 


Glucose  104 H    (74-99)  mg/dL


 


AST    15 L  (17-59)  U/L

## 2024-09-27 NOTE — P.PN
Subjective





Patient is seen in follow-up for end-stage renal disease.  He is maintained on 

hemodialysis on Monday Wednesday Friday schedule.  Currently in the ICU.  

Hemodynamically stable.  NG tube clamped.





Vital signs are stable.


General: No acute distress.


HEENT: NG tube noted.


LUNGS: No audible rhonchi or wheezes.


HEART: Regular rate and rhythm.


ABDOMEN: Nontender.


EXTREMITITES: No edema.





Objective





- Vital Signs


Vital signs: 


                                   Vital Signs











Temp  98.9 F   09/27/24 08:00


 


Pulse  73   09/27/24 09:00


 


Resp  19   09/27/24 09:00


 


BP  104/80   09/27/24 09:00


 


Pulse Ox  91 L  09/27/24 09:00


 


FiO2      








                                 Intake & Output











 09/26/24 09/27/24 09/27/24





 18:59 06:59 18:59


 


Intake Total 800 900 325


 


Output Total 150 300 0


 


Balance 650 600 325


 


Weight  80.7 kg 


 


Intake:   


 


   900 225


 


    Sodium Chloride 0.9% 1, 375 900 225





    000 ml @ 75 mls/hr IV .   





    [id number] [facility] Rx#:[id number]   


 


  Intake, IV Titration 425  





  Amount   


 


    Diltiazem 125 mg In 125  





    Sodium Chloride 0.9% 100   





    ml @ 10 MG/HR 10 mls/hr   





    IV .[id number] [facility] Rx#:   





    [id number]   


 


    Sodium Chloride 0.9% 1, 300  





    000 ml @ 75 mls/hr IV .   





    [id number] [facility] Rx#:[id number]   


 


  Oral   100


 


Output:   


 


  Gastric Drainage 150 300 


 


  Urine 0 0 0


 


Other:   


 


  # Bowel Movements  0 0














- Labs


CBC & Chem 7: 


                                 09/27/24 02:45





                                 09/27/24 02:45


Labs: 


                  Abnormal Lab Results - Last 24 Hours (Table)











  09/27/24 09/27/24 Range/Units





  02:45 02:45 


 


RBC  3.68 L   (4.30-5.90)  m/uL


 


Hgb  11.3 L   (13.0-17.5)  gm/dL


 


Hct  35.1 L   (39.0-53.0)  %


 


Lymphocytes #  0.6 L   (1.0-4.8)  k/uL


 


Sodium   135 L  (137-145)  mmol/L


 


Potassium   5.7 H  (3.5-5.1)  mmol/L


 


Chloride   93 L  ()  mmol/L


 


BUN   63 H  (9-20)  mg/dL


 


Creatinine   9.76 H*  (0.66-1.25)  mg/dL


 


AST   15 L  (17-59)  U/L














Assessment and Plan


Plan: 





Assessment:


1.  End-stage renal disease maintained on hemodialysis on Monday Wednesday Friday schedule.


2.  Abdominal pain due to ileus versus bowel obstruction.  Surgery following.  

Has NG tube.


3.  Coronary disease with cardiac stenting.


4.  Peripheral vascular disease status post surgical invention in the past.  

Patient follows with vascular surgery from [facility].


5.  Chronic kidney disease mineral bone disease maintained on Sensipar and 

PhosLo outpatient.


6.  History of hepatitis B maintained on tenofovir.


7.  Hyperkalemia secondary to chronic kidney disease. 


8.  SVTs status post Cardizem drip.





Plan:


Hemodialysis today.


Hep-Lock IV fluids once diet initiated.

## 2024-09-28 VITALS — RESPIRATION RATE: 18 BRPM

## 2024-09-28 LAB
ALBUMIN SERPL-MCNC: 3.2 G/DL (ref 3.5–5)
ALP SERPL-CCNC: 69 U/L (ref 38–126)
ALT SERPL-CCNC: 8 U/L (ref 4–49)
ANION GAP SERPL CALC-SCNC: 14 MMOL/L
AST SERPL-CCNC: 15 U/L (ref 17–59)
BASOPHILS # BLD AUTO: 0 K/UL (ref 0–0.2)
BASOPHILS NFR BLD AUTO: 1 %
BUN SERPL-SCNC: 37 MG/DL (ref 9–20)
CALCIUM SPEC-MCNC: 8.6 MG/DL (ref 8.4–10.2)
CHLORIDE SERPL-SCNC: 93 MMOL/L (ref 98–107)
CO2 SERPL-SCNC: 25 MMOL/L (ref 22–30)
EOSINOPHIL # BLD AUTO: 0.1 K/UL (ref 0–0.7)
EOSINOPHIL NFR BLD AUTO: 4 %
ERYTHROCYTE [DISTWIDTH] IN BLOOD BY AUTOMATED COUNT: 3.3 M/UL (ref 4.3–5.9)
ERYTHROCYTE [DISTWIDTH] IN BLOOD: 14.8 % (ref 11.5–15.5)
GLUCOSE SERPL-MCNC: 80 MG/DL (ref 74–99)
HCT VFR BLD AUTO: 30.7 % (ref 39–53)
HGB BLD-MCNC: 10.3 GM/DL (ref 13–17.5)
LYMPHOCYTES # SPEC AUTO: 0.6 K/UL (ref 1–4.8)
LYMPHOCYTES NFR SPEC AUTO: 20 %
MCH RBC QN AUTO: 31.3 PG (ref 25–35)
MCHC RBC AUTO-ENTMCNC: 33.7 G/DL (ref 31–37)
MCV RBC AUTO: 92.9 FL (ref 80–100)
MONOCYTES # BLD AUTO: 0.2 K/UL (ref 0–1)
MONOCYTES NFR BLD AUTO: 8 %
NEUTROPHILS # BLD AUTO: 2 K/UL (ref 1.3–7.7)
NEUTROPHILS NFR BLD AUTO: 65 %
PLATELET # BLD AUTO: 166 K/UL (ref 150–450)
POTASSIUM SERPL-SCNC: 4.2 MMOL/L (ref 3.5–5.1)
PROT SERPL-MCNC: 5.6 G/DL (ref 6.3–8.2)
SODIUM SERPL-SCNC: 132 MMOL/L (ref 137–145)
WBC # BLD AUTO: 3 K/UL (ref 3.8–10.6)

## 2024-09-28 NOTE — P.PN
Subjective





Patient seen and evaluated at bedside. Patient doing well, having bowel 

movements. 





Objective





- Vital Signs


Vital signs: 


                                   Vital Signs











Temp  97.6 F   09/28/24 08:00


 


Pulse  62   09/28/24 12:52


 


Resp  18   09/28/24 12:52


 


BP  112/59   09/28/24 08:00


 


Pulse Ox  97   09/28/24 12:00


 


FiO2      








                                 Intake & Output











 09/27/24 09/28/24 09/28/24





 18:59 06:59 18:59


 


Intake Total 1750 280 20


 


Output Total 400 0 


 


Balance 1350 280 20


 


Weight  79.5 kg 


 


Intake:   


 


   40 20


 


    Invasive Line 1  20 10


 


    Invasive Line 3  20 10


 


    Sodium Chloride 0.9% 1, 750  





    000 ml @ 75 mls/hr IV .   





    [id number] [facility] Rx#:[id number]   


 


  Oral 600 240 


 


  Hemodialysis 400  


 


Output:   


 


  Urine 0 0 


 


  Hemodialysis 400  


 


  Hemodialysis Net Amount 0  


 


Other:   


 


  Voiding Method  Toilet Toilet





  Urinal Urinal


 


  # Bowel Movements 0  














- Exam





gen; nad


cv: rrr


pul: non lboared breathing


abd: soft, non distended, non tender to palpation, no guarding or rebound 

tenderness





- Labs


CBC & Chem 7: 


                                 09/28/24 05:38





                                 09/28/24 05:38


Labs: 


                  Abnormal Lab Results - Last 24 Hours (Table)











  09/28/24 09/28/24 Range/Units





  05:38 05:38 


 


WBC  3.0 L   (3.8-10.6)  k/uL


 


RBC  3.30 L   (4.30-5.90)  m/uL


 


Hgb  10.3 L   (13.0-17.5)  gm/dL


 


Hct  30.7 L   (39.0-53.0)  %


 


Lymphocytes #  0.6 L   (1.0-4.8)  k/uL


 


Sodium   132 L  (137-145)  mmol/L


 


Chloride   93 L  ()  mmol/L


 


BUN   37 H  (9-20)  mg/dL


 


Creatinine   6.31 H  (0.66-1.25)  mg/dL


 


AST   15 L  (17-59)  U/L


 


Total Protein   5.6 L  (6.3-8.2)  g/dL


 


Albumin   3.2 L  (3.5-5.0)  g/dL














Assessment and Plan


Assessment: 





60 yo male w/ resolving bowel obstruction


-advance diet


-discharge planning in the next 24hrs


Time with Patient: Less than 30

## 2024-09-28 NOTE — P.PN
Subjective





Patient is seen in follow-up for end-stage renal disease.  He is maintained on 

hemodialysis on Monday Wednesday Friday schedule.  Transferred out of the ICU.  

NG tube removed.  Will be starting regular diet this afternoon.





Vital signs are stable.


General: No acute distress.


HEENT: On room air.


LUNGS: No audible rhonchi or wheezes.


HEART: Regular rate and rhythm.


ABDOMEN: Nontender.


EXTREMITITES: No edema.





Objective





- Vital Signs


Vital signs: 


                                   Vital Signs











Temp  97.6 F   09/28/24 08:00


 


Pulse  64   09/28/24 08:59


 


Resp  18   09/28/24 08:00


 


BP  112/59   09/28/24 08:00


 


Pulse Ox  98   09/28/24 08:00


 


FiO2      








                                 Intake & Output











 09/27/24 09/28/24 09/28/24





 18:59 06:59 18:59


 


Intake Total 1750 280 


 


Output Total 400 0 


 


Balance 1350 280 


 


Weight  79.5 kg 


 


Intake:   


 


   40 


 


    Invasive Line 1  20 


 


    Invasive Line 3  20 


 


    Sodium Chloride 0.9% 1, 750  





    000 ml @ 75 mls/hr IV .   





    [id number] [facility] Rx#:[id number]   


 


  Oral 600 240 


 


  Hemodialysis 400  


 


Output:   


 


  Urine 0 0 


 


  Hemodialysis 400  


 


  Hemodialysis Net Amount 0  


 


Other:   


 


  Voiding Method  Toilet 





  Urinal 


 


  # Bowel Movements 0  














- Labs


CBC & Chem 7: 


                                 09/28/24 05:38





                                 09/28/24 05:38


Labs: 


                  Abnormal Lab Results - Last 24 Hours (Table)











  09/28/24 09/28/24 Range/Units





  05:38 05:38 


 


WBC  3.0 L   (3.8-10.6)  k/uL


 


RBC  3.30 L   (4.30-5.90)  m/uL


 


Hgb  10.3 L   (13.0-17.5)  gm/dL


 


Hct  30.7 L   (39.0-53.0)  %


 


Lymphocytes #  0.6 L   (1.0-4.8)  k/uL


 


Sodium   132 L  (137-145)  mmol/L


 


Chloride   93 L  ()  mmol/L


 


BUN   37 H  (9-20)  mg/dL


 


Creatinine   6.31 H  (0.66-1.25)  mg/dL


 


AST   15 L  (17-59)  U/L


 


Total Protein   5.6 L  (6.3-8.2)  g/dL


 


Albumin   3.2 L  (3.5-5.0)  g/dL














Assessment and Plan


Plan: 





Assessment:


1.  End-stage renal disease maintained on hemodialysis on Monday Wednesday Friday schedule.


2.  Abdominal pain due to ileus versus bowel obstruction.  Surgery following.  

NG tube removed.


3.  Coronary artery disease with cardiac stenting.


4.  Peripheral vascular disease status post surgical invention in the past.  

Patient follows with vascular surgery from [facility].


5.  Chronic kidney disease mineral bone disease maintained on Sensipar and 

PhosLo outpatient.


6.  History of hepatitis B maintained on tenofovir.


7.  Hyperkalemia secondary to chronic kidney disease.  Improved postdialysis.


8.  SVTs status post Cardizem drip.  Heart rate controlled.





Plan:


Hemodialysis Monday.


Off IV fluids.


Check phosphorus level tomorrow.

## 2024-09-28 NOTE — P.PN
Subjective


Progress Note Date: 09/28/24


The patient is a 59-year-old male, end-stage renal disease status post 

transplant that failed his on hemodialysis and presented with abdominal 

discomfort and small bowel obstruction.  He was transferred to the ICU because 

of hypotension.  He had a run of SVT and cardiology consultation was requested. 

He is in sinus mechanism at this time on IV Cardizem.  He is hemodynamically 

stable.  He denies any chest discomfort, dizziness or palpitations.  He has no 

significant dyspnea.  He has a history of COPD.  He is followed by cardiologist 

at [facility] and had a prior stenting about 5 years ago and has a 

history of peripheral vascular disease with femorofemoral bypass.  He has no 

clear PND, orthopnea or peripheral edema.  He cannot recall a prior documented 

history of malignant arrhythmia.  His ejection fraction estimation is not 

available to me.  He has an NG tube and feels better with minimal flatus but 

improvement in his abdominal discomfort.  He has no history of smoking.  He had 

a history of renal disease at a young age.  He has no urinary output.  He has a 

history of hyperlipidemia, he is nondiabetic.


September 27:


The patient feels better today, his abdominal pain is better.  He denies any 

chest discomfort, dizziness or palpitations.  He continues to be in sinus 

mechanism.  He continues to have an NG tube.  He is on IV Cardizem.  He 

ambulated yesterday without significant difficulties.  His echocardiogram showed

an ejection fraction of 45 to 50% with mild aortic stenosis and mild mitral 

regurgitation.





9/28/2024


Patient was seen and examined resting comfortably in bed.  Is overall feeling 

better he is tolerating a clear liquid diet.  Has been initiated on metoprolol 

25 mg p.o. twice daily.  He underwent dialysis yesterday.  Labs this morning 

showed potassium 4.2, BUN 37 and creatinine of 6.31.  He has had no 

palpitations, dizziness or lightheadedness.  His breathing is stable.  He has 

had no orthopnea or PND.  He has had no chest discomfort.  He is eager to be di

scharged home as his daughter is getting  next weekend.











Objective





- Vital Signs


Vital signs: 


                                   Vital Signs











Temp  97.6 F   09/28/24 08:00


 


Pulse  64   09/28/24 08:59


 


Resp  18   09/28/24 08:00


 


BP  112/59   09/28/24 08:00


 


Pulse Ox  98   09/28/24 08:00


 


FiO2      








                                 Intake & Output











 09/27/24 09/28/24 09/28/24





 18:59 06:59 18:59


 


Intake Total 1750 280 


 


Output Total 400 0 


 


Balance 1350 280 


 


Weight  79.5 kg 


 


Intake:   


 


   40 


 


    Invasive Line 1  20 


 


    Invasive Line 3  20 


 


    Sodium Chloride 0.9% 1, 750  





    000 ml @ 75 mls/hr IV .   





    [id number] [facility]UL Rx#:[id number]   


 


  Oral 600 240 


 


  Hemodialysis 400  


 


Output:   


 


  Urine 0 0 


 


  Hemodialysis 400  


 


  Hemodialysis Net Amount 0  


 


Other:   


 


  Voiding Method  Toilet 





  Urinal 


 


  # Bowel Movements 0  














- Exam


PHYSICAL EXAMINATION:


Blood pressure 103/60 heart rate 72


LUNGS: Clear to auscultation


HEART: Regular rate and rhythm, S1, S2. No S3.  Systolic ejection murmur at the 

base with a holosystolic murmur at the apex, 2/6


ABDOMEN: Soft, nontender, no organomegaly, positive bowel sounds


EXTREMETIES: No edema








- Labs


CBC & Chem 7: 


                                 09/28/24 05:38





                                 09/28/24 05:38


Labs: 


                  Abnormal Lab Results - Last 24 Hours (Table)











  09/28/24 09/28/24 Range/Units





  05:38 05:38 


 


WBC  3.0 L   (3.8-10.6)  k/uL


 


RBC  3.30 L   (4.30-5.90)  m/uL


 


Hgb  10.3 L   (13.0-17.5)  gm/dL


 


Hct  30.7 L   (39.0-53.0)  %


 


Lymphocytes #  0.6 L   (1.0-4.8)  k/uL


 


Sodium   132 L  (137-145)  mmol/L


 


Chloride   93 L  ()  mmol/L


 


BUN   37 H  (9-20)  mg/dL


 


Creatinine   6.31 H  (0.66-1.25)  mg/dL


 


AST   15 L  (17-59)  U/L


 


Total Protein   5.6 L  (6.3-8.2)  g/dL


 


Albumin   3.2 L  (3.5-5.0)  g/dL














Assessment and Plan


Assessment: 


1.  Small bowel obstruction versus ileus, improving


2.  History of CAD with no evidence of ACS


3.  1 episode of SVT, resolved


4.  History of peripheral vascular disease status post femorofemoral bypass


5.  End-stage renal disease, post transplant


6.  Hyperlipidemia





Plan: 


From cardiology's perspective medications were reviewed and we will continue the

same.  We anticipate the patient will be discharged home soon.  He will follow-

up as an outpatient with his primary cardiologist Dr. Helm. 





NP note has been reviewed, I agree with a documented findings and plan of care. 

Patient was seen and examined.

## 2024-09-28 NOTE — P.PN
Subjective


Progress Note Date: 09/28/24


Evaluated patient today in the intensive care unit resting in bed. Not reporting

any specific abdominal pain with the exception of some mild tenderness with 

palpation.  NG tube was placed yesterday with 2L of gastric output. Ended up in 

the ICU secondary to hypotension. NG tube currently in place and patient is NPO.

He is not passing gas. Underwent small bowel follow through this AM showing 

limited exam terminated at 330 minutes with no contrast seen within the colon. 

Bowel obstruction in the differential diagnosis, correlate for constipation. 

Sodium 136, potassium 5.3, BUN 49, creatinine 7.91. Was placed on IV cardizem 

overnight due to episode of SVT. Remains on cardizem running at 10 mls/hr. On n

ormal saline at 75 mls/hr. 





09/27/2024


Patient is evaluated in follow-up remains in the intensive care unit he has been

downgraded to the medical floor pending a bed.  Patient states that he is passi

ng a lot of gas but has not had a bowel movement yet.  Abdominal x-ray today 

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 

artery with wide area within the mid abdomen.  Patient remains n.p.o. at this 

time.  He is currently undergoing hemodialysis per nephrology.  Cardizem has 

been discontinued patient remains in normal sinus rhythm.





09/28/2024


Patient is evaluated today in follow up on the medical floor. Diet has been 

advanced to regular and he is having bowel movement. NG tube has been removed. 

He is on the medical floor now. Plans for DC home tomorrow. 





Review of Systems





Constitutional: Denied any fatigue denied any fever.


Cardio vascular: denied any chest pain, palpitations


Gastrointestinal: denied any nausea, vomiting, diarrhea


Pulmonary: Denied any shortness of breath cough


Neurologic denied any new focal deficits





All inpatient medications were reviewed and appropriate changes in these 

medications as dictated in the interval history and assessment and plan.





PHYSICAL EXAMINATION: 





GENERAL: The patient is alert and oriented x3, not in any acute distress. Well 

developed, well nourished. 


HEENT: Pupils are round and equally reacting to light. EOMI. No scleral icterus.

No conjunctival pallor. Normocephalic, atraumatic. No pharyngeal erythema. No 

thyromegaly. 


CARDIOVASCULAR: S1 and S2 present. No murmurs, rubs, or gallops. 


PULMONARY: Chest is clear to auscultation, no wheezing or crackles. 


ABDOMEN: Soft, nontender, nondistended, normoactive bowel sounds. No palpable 

organomegaly. NG tube in place 


MUSCULOSKELETAL: No joint swelling or deformity.


EXTREMITIES: No cyanosis, clubbing, or pedal edema. 


NEUROLOGICAL: Gross neurological examination did not reveal any focal deficits. 


SKIN: No rashes. 





Assessment


Small bowel obstruction vs. Ileus resolved with conservative management.


End stage renal disease maintained on hemodialysis 


Episode of SVT


Hyperkalemia 


Chronic pleural effusion 


Hx of left kidney transplant


Hepatitis B


COPD with no acute exacerbation


Gastroesophageal reflux disease


Hypertension


Hyperlipidemia 


Former Smoker 


GI prophylaxis


DVT prophylaxis


Full Code





Plan 


Regular diet. 


IV cardizem has been discontinued patient has been placed on oral metoprolol


General surgery following


Nephrology following, continue hemodialysis MWF


Repeat blood work in the AM. 





The impression and plan of care has been dictated by Oumou Muhammad Nurse 

Practitioner as directed.





Dr. Jessica MD


I have performed a history and physical examination and medical decision making 

of this patient, discussed the same with the dictator, and agree with the 

dictators assessment and plan as written, documented as a scribe. Based on total

visit time, I have performed more than 50% of this visit.





Objective





- Vital Signs


Vital signs: 


                                   Vital Signs











Temp  97.6 F   09/28/24 08:00


 


Pulse  62   09/28/24 12:52


 


Resp  18   09/28/24 12:52


 


BP  112/59   09/28/24 08:00


 


Pulse Ox  97   09/28/24 12:00


 


FiO2      








                                 Intake & Output











 09/27/24 09/28/24 09/28/24





 18:59 06:59 18:59


 


Intake Total 1750 280 20


 


Output Total 400 0 


 


Balance 1350 280 20


 


Weight  79.5 kg 


 


Intake:   


 


   40 20


 


    Invasive Line 1  20 10


 


    Invasive Line 3  20 10


 


    Sodium Chloride 0.9% 1, 750  





    000 ml @ 75 mls/hr IV .   





    [id number] [facility] Rx#:[id number]   


 


  Oral 600 240 


 


  Hemodialysis 400  


 


Output:   


 


  Urine 0 0 


 


  Hemodialysis 400  


 


  Hemodialysis Net Amount 0  


 


Other:   


 


  Voiding Method  Toilet Toilet





  Urinal Urinal


 


  # Bowel Movements 0  














- Labs


CBC & Chem 7: 


                                 09/28/24 05:38





                                 09/28/24 05:38


Labs: 


                  Abnormal Lab Results - Last 24 Hours (Table)











  09/28/24 09/28/24 Range/Units





  05:38 05:38 


 


WBC  3.0 L   (3.8-10.6)  k/uL


 


RBC  3.30 L   (4.30-5.90)  m/uL


 


Hgb  10.3 L   (13.0-17.5)  gm/dL


 


Hct  30.7 L   (39.0-53.0)  %


 


Lymphocytes #  0.6 L   (1.0-4.8)  k/uL


 


Sodium   132 L  (137-145)  mmol/L


 


Chloride   93 L  ()  mmol/L


 


BUN   37 H  (9-20)  mg/dL


 


Creatinine   6.31 H  (0.66-1.25)  mg/dL


 


AST   15 L  (17-59)  U/L


 


Total Protein   5.6 L  (6.3-8.2)  g/dL


 


Albumin   3.2 L  (3.5-5.0)  g/dL














Assessment and Plan


Time with Patient: Less than 30

## 2024-09-28 NOTE — P.PN
Subjective


Progress Note Date: 09/28/24








This is a 59-year-old male patient who got transferred to the ICU because of 

hypotension.  The patient is admitted currently for small bowel obstruction.  

The patient is known to have end-stage renal disease on hemodialysis and his 

last hemodialysis session was yesterday.  He gets dialyzed MWF.  He has had 

previous kidney transplantation and has essentially failed.  He has had previous

surgeries in his abdomen for transplantation.  He presented to the hospital 

because of abdominal pain, cramping, nausea and emesis and his last bowel 

movement was around 4 days ago.  CAT scan of the abdomen showed dilated small 

bowel consistent with ileus/small bowel obstruction.  General surgery is on the 

case.  Because of ongoing discomfort, NG tube was inserted and immediately 

approximately 2 L of gastric material was aspirated.  Subsequently patient 

became hypotensive.  He starts cardiac blood pressure is was in the mid 70s.  He

got transferred to the ICU.  Given half a liter of normal saline and the current

blood pressure is 113/61.  He is awake and alert.  He is currently on room air 

oxygen with a pulse ox of 99%.  No significant tachycardia.  No reported fever. 

The white cell count is at 6.2 with a hemoglobin of 11.5 and a platelet count of

166.  BUN 65 with a creatinine of 9.7 and the patient's potassium level is at 

5.8 and a sodium level of 136.  Amylase and lipase are within normal limits.  

LFTs are within normal limits.  CAT scan of the abdomen also showed a loculated 

pleural effusion in the left lung base above the left hemidiaphragm with a 

calcified rim which is a sequelae of previous pneumonia/parapneumonic effusion. 

He has another calcified rim collection in the mid pelvis.  Denies having any 

cough or sputum production.  No significant respiratory distress.











(Done 4 hours during the 9/26/2024, the patient is being seen for a follow-up.  

NG tube is still in place and the patient denies having any significant nausea 

or vomiting and no significant abdominal pains on today's evaluation.  The 

patient has produced approximately 3.3 L of gastric output since insertion of 

the NG tube.  Currently, IV fluids are at KVO.  The patient underwent 

hemodialysis yesterday with ultrafiltration of 500 cc of fluids.  No fever.  No 

chills.  No tachycardia.  He did encounter a run of SVT yesterday and based on 

that, the patient was started on a Cardizem drip which is running at 10 mg an 

hour.  Currently, he is in normal sinus rhythm.  Cardiology has been consulted. 

The white cell count is at 5 with a hemoglobin 11.3 and a platelet count of 153.

 Sodium is at 136, potassium is at 5.3, BUN is 49 with a creatinine of 7.9 and 

the patient serum bicarb is at 23.  LFTs are all within normal limits.  Amylase 

and lipase are also within normal limits.  A follow-up small bowel x-ray that 

was done today showed limited examination with no contrast seen in the colon.  

Bowel obstruction is likely still present.  The patient has no altered 

mentation.  Communicating.  Denies having any specific complaints.Echocardiogram

was completed yesterday and the patient was found to have LV ejection fraction 

of 45 to 50%.  There was also mild increase in the septal wall thickness.  

Increase in the left ventricular volume systolic.  No significant valvular 

abnormalities other than some mild degree of aortic stenosis.





On 9/27/2024, the patient is being seen for a follow-up.  The patient is doing 

well with no specific complaints.  No nausea vomiting or abdominal pain.  The 

patient is passing flatus.  NG tube has been clamped.  The patient is also 

taking some popsicles and ice chips.  Otherwise, hemodynamically stable.  No 

angina or palpitation.  No shortness of breath.  No chest pain.  The white cell 

count is at 4.8 with hemoglobin of 11.3 and a platelet count of 173.  BUN 63 

with a creatinine of 9.7 and the patient's potassium level is at 5.7.  The 

patient is going to undergo another session of hemodialysis today.  No other 

significant events overnight.  Respiratory status is stable and the patient is 

currently on room air oxygen.  No other significant events overnight.  First t

festus of the abdomen was done today and it showed an unremarkable abdomen.  

Aneurysmal dilatation of the right common iliac artery was noted.





On 9/28/2024, seen the patient for a follow-up.  Patient is able to tolerate 

clear liquid diet.  Denies having any nausea or vomiting or chest pain or 

abdominal pain.  He has a history of renal disease and is currently on 

hemodialysis.  On today's blood work, sodium is at 132, BUN 37 with a creatinine

of 6.3.  White cell count is at 3 with a heme of 7.3 and a platelet count of 

166.  He is afebrile.  He is hemodynamically stable.  He is ambulating.  Using 

the incentive spirometer.  No other significant events overnight.  The patient 

got transferred out of the intensive care unit today.





Objective





- Vital Signs


Vital signs: 


                                   Vital Signs











Temp  98.2 F   09/28/24 04:00


 


Pulse  82   09/28/24 04:00


 


Resp  17   09/28/24 04:00


 


BP  97/54   09/28/24 04:00


 


Pulse Ox  96   09/28/24 04:00


 


FiO2      








                                 Intake & Output











 09/27/24 09/28/24 09/28/24





 18:59 06:59 18:59


 


Intake Total 1750 280 


 


Output Total 400 0 


 


Balance 1350 280 


 


Weight  79.5 kg 


 


Intake:   


 


   40 


 


    Invasive Line 1  20 


 


    Invasive Line 3  20 


 


    Sodium Chloride 0.9% 1, 750  





    000 ml @ 75 mls/hr IV .   





    [id number] [facility] Rx#:[id number]   


 


  Oral 600 240 


 


  Hemodialysis 400  


 


Output:   


 


  Urine 0 0 


 


  Hemodialysis 400  


 


  Hemodialysis Net Amount 0  


 


Other:   


 


  Voiding Method  Toilet 





  Urinal 


 


  # Bowel Movements 0  














- Exam











- Constitutional


General appearance: no acute distress, NG tube removed





- EENT


Eyes: EOMI


Ears: negative: bulging, bullous, dull, erythema, fluid, myringotomy tube, 

obstructed by cerumen, scarring, unable to vistualize, other





- Neck


Neck: no lymphadenopathy


Carotids: negative: upstroke normal, upstroke delayed, upstroke diminished, 

upstroke bounding, bruit absent, bruit present


Thyroid: negative: normal size, enlarged, firm, nodule





- Respiratory


Respiratory: negative: CTA, diminished, dullness, rales, rhonchi, wheezing, 

prolonged expiration, prolonged inspiration, other





- Cardiovascular


Rhythm: regular


Heart sounds: abnormal: S1, S2





- Gastrointestinal


General gastrointestinal: Positive bowel sounds are present, abdomen is Soft, 

well-healed surgical scarring, nondistended, no rebound or guarding, tenderness 

to palpation in bilateral lower quadrants


 








- Integumentary


Integumentary: normal, normal turgor





- Neurologic


Neurologic: CNII-XII intact





- Musculoskeletal


Musculoskeletal: gait normal





- Psychiatric


Psychiatric: A&O x's 3 








- Labs


CBC & Chem 7: 


                                 09/28/24 05:38





                                 09/28/24 05:38


Labs: 


                  Abnormal Lab Results - Last 24 Hours (Table)











  09/28/24 09/28/24 Range/Units





  05:38 05:38 


 


WBC  3.0 L   (3.8-10.6)  k/uL


 


RBC  3.30 L   (4.30-5.90)  m/uL


 


Hgb  10.3 L   (13.0-17.5)  gm/dL


 


Hct  30.7 L   (39.0-53.0)  %


 


Lymphocytes #  0.6 L   (1.0-4.8)  k/uL


 


Sodium   132 L  (137-145)  mmol/L


 


Chloride   93 L  ()  mmol/L


 


BUN   37 H  (9-20)  mg/dL


 


Creatinine   6.31 H  (0.66-1.25)  mg/dL


 


AST   15 L  (17-59)  U/L


 


Total Protein   5.6 L  (6.3-8.2)  g/dL


 


Albumin   3.2 L  (3.5-5.0)  g/dL














Assessment and Plan


Plan: 








Small bowel obstruction/ileus, recovered and the NG tube has been removed and 

the patient is tolerating clear liquid diet





Hypovolemic shock with secondary hypotension, responded to IV fluids.  The 

patient is currently normotensive





End-stage renal disease on hemodialysis 3 times a week, MWF, underwent a short 

session of hemodialysis on 9/25/2024 with a ultrafiltration of 500 cc.





Mild hyperkalemia related to end-stage renal disease





History of kidney transplantation x 2





History of hepatitis B





Hypertension, history of





Hyperlipidemia





COPD





Chronic pleural fluid and mid abdominal fluid collection with a calcified rim.  

The first is most likely a sequela of a previous pneumonia and parapneumonic 

effusion.  The second is most likely related to previous abdominal surgeries.  

Those are benign findings SVT, currently back into normal sinus rhythm and the 

patient is currently on Cardizem drip at 10 mg an hour.





CHF with mild impairment of LV function and mild aortic stenosis.





Plan





Advance diet as tolerated and the patient is currently on clear liquid diet


NG tube has been removed


Hemodialysis was done yesterday


Nephrology is on the case


Will continue to follow


The patient will be transferred out of the intensive care unit today.  Encourage

mobility.  Room air oxygen.  No respiratory distress.

## 2024-09-29 VITALS — TEMPERATURE: 98.3 F | SYSTOLIC BLOOD PRESSURE: 124 MMHG | DIASTOLIC BLOOD PRESSURE: 64 MMHG

## 2024-09-29 VITALS — HEART RATE: 65 BPM

## 2024-09-29 LAB
ALBUMIN SERPL-MCNC: 3.3 G/DL (ref 3.5–5)
ALP SERPL-CCNC: 72 U/L (ref 38–126)
ALT SERPL-CCNC: 9 U/L (ref 4–49)
ANION GAP SERPL CALC-SCNC: 15 MMOL/L
AST SERPL-CCNC: 15 U/L (ref 17–59)
BASOPHILS # BLD AUTO: 0 K/UL (ref 0–0.2)
BASOPHILS NFR BLD AUTO: 0 %
BUN SERPL-SCNC: 48 MG/DL (ref 9–20)
CALCIUM SPEC-MCNC: 8.5 MG/DL (ref 8.4–10.2)
CHLORIDE SERPL-SCNC: 92 MMOL/L (ref 98–107)
CO2 SERPL-SCNC: 27 MMOL/L (ref 22–30)
EOSINOPHIL # BLD AUTO: 0.1 K/UL (ref 0–0.7)
EOSINOPHIL NFR BLD AUTO: 2 %
ERYTHROCYTE [DISTWIDTH] IN BLOOD BY AUTOMATED COUNT: 3.31 M/UL (ref 4.3–5.9)
ERYTHROCYTE [DISTWIDTH] IN BLOOD: 14.7 % (ref 11.5–15.5)
GLUCOSE SERPL-MCNC: 114 MG/DL (ref 74–99)
HCT VFR BLD AUTO: 30.4 % (ref 39–53)
HGB BLD-MCNC: 10.5 GM/DL (ref 13–17.5)
LYMPHOCYTES # SPEC AUTO: 0.5 K/UL (ref 1–4.8)
LYMPHOCYTES NFR SPEC AUTO: 15 %
MCH RBC QN AUTO: 31.8 PG (ref 25–35)
MCHC RBC AUTO-ENTMCNC: 34.6 G/DL (ref 31–37)
MCV RBC AUTO: 91.7 FL (ref 80–100)
MONOCYTES # BLD AUTO: 0.2 K/UL (ref 0–1)
MONOCYTES NFR BLD AUTO: 7 %
NEUTROPHILS # BLD AUTO: 2.4 K/UL (ref 1.3–7.7)
NEUTROPHILS NFR BLD AUTO: 74 %
PLATELET # BLD AUTO: 166 K/UL (ref 150–450)
POTASSIUM SERPL-SCNC: 3.9 MMOL/L (ref 3.5–5.1)
PROT SERPL-MCNC: 5.8 G/DL (ref 6.3–8.2)
SODIUM SERPL-SCNC: 134 MMOL/L (ref 137–145)
WBC # BLD AUTO: 3.2 K/UL (ref 3.8–10.6)

## 2024-09-29 NOTE — P.PN
Subjective


Progress Note Date: 09/29/24


The patient is a 59-year-old male, end-stage renal disease status post 

transplant that failed his on hemodialysis and presented with abdominal 

discomfort and small bowel obstruction.  He was transferred to the ICU because 

of hypotension.  He had a run of SVT and cardiology consultation was requested. 

He is in sinus mechanism at this time on IV Cardizem.  He is hemodynamically 

stable.  He denies any chest discomfort, dizziness or palpitations.  He has no 

significant dyspnea.  He has a history of COPD.  He is followed by cardiologist 

at [facility] and had a prior stenting about 5 years ago and has a 

history of peripheral vascular disease with femorofemoral bypass.  He has no 

clear PND, orthopnea or peripheral edema.  He cannot recall a prior documented 

history of malignant arrhythmia.  His ejection fraction estimation is not 

available to me.  He has an NG tube and feels better with minimal flatus but 

improvement in his abdominal discomfort.  He has no history of smoking.  He had 

a history of renal disease at a young age.  He has no urinary output.  He has a 

history of hyperlipidemia, he is nondiabetic.


September 27:


The patient feels better today, his abdominal pain is better.  He denies any 

chest discomfort, dizziness or palpitations.  He continues to be in sinus 

mechanism.  He continues to have an NG tube.  He is on IV Cardizem.  He 

ambulated yesterday without significant difficulties.  His echocardiogram showed

an ejection fraction of 45 to 50% with mild aortic stenosis and mild mitral 

regurgitation.





9/28/2024


Patient was seen and examined resting comfortably in bed.  Is overall feeling 

better he is tolerating a clear liquid diet.  Has been initiated on metoprolol 

25 mg p.o. twice daily.  He underwent dialysis yesterday.  Labs this morning 

showed potassium 4.2, BUN 37 and creatinine of 6.31.  He has had no 

palpitations, dizziness or lightheadedness.  His breathing is stable.  He has 

had no orthopnea or PND.  He has had no chest discomfort.  He is eager to be di

scharged home as his daughter is getting  next weekend.





9/29/2024


Patient was seen and examined resting comfortably in bed.  He is overall feeling

well.  He is tolerating his diet.  He is tolerating the metoprolol 25 mg p.o. 

daily.  Vital signs are stable.  Creatinine 8.52 and is being followed by 

nephrology.  He is maintaining sinus mechanism.











Objective





- Vital Signs


Vital signs: 


                                   Vital Signs











Temp  97.9 F   09/29/24 08:00


 


Pulse  69   09/29/24 11:29


 


Resp  18   09/29/24 08:00


 


BP  127/53   09/29/24 08:00


 


Pulse Ox  97   09/29/24 08:00


 


FiO2      








                                 Intake & Output











 09/28/24 09/29/24 09/29/24





 18:59 06:59 18:59


 


Intake Total 40 40 20


 


Balance 40 40 20


 


Weight  80.6 kg 


 


Intake:   


 


  IV 40 40 20


 


    Invasive Line 1 20 20 10


 


    Invasive Line 3 20 20 10


 


Other:   


 


  Voiding Method Toilet Toilet Toilet





 Urinal Urinal Urinal


 


  # Voids  1 














- Exam


PHYSICAL EXAMINATION:


Blood pressure 103/60 heart rate 72


LUNGS: Clear to auscultation


HEART: Regular rate and rhythm, S1, S2. No S3.  Systolic ejection murmur at the 

base with a holosystolic murmur at the apex, 2/6


ABDOMEN: Soft, nontender, no organomegaly, positive bowel sounds


EXTREMETIES: No edema








- Labs


CBC & Chem 7: 


                                 09/29/24 08:03





                                 09/29/24 08:03


Labs: 


                  Abnormal Lab Results - Last 24 Hours (Table)











  09/29/24 09/29/24 Range/Units





  08:03 08:03 


 


WBC  3.2 L   (3.8-10.6)  k/uL


 


RBC  3.31 L   (4.30-5.90)  m/uL


 


Hgb  10.5 L   (13.0-17.5)  gm/dL


 


Hct  30.4 L   (39.0-53.0)  %


 


Lymphocytes #  0.5 L   (1.0-4.8)  k/uL


 


Sodium   134 L  (137-145)  mmol/L


 


Chloride   92 L  ()  mmol/L


 


BUN   48 H  (9-20)  mg/dL


 


Creatinine   8.52 H*  (0.66-1.25)  mg/dL


 


Glucose   114 H  (74-99)  mg/dL


 


AST   15 L  (17-59)  U/L


 


Total Protein   5.8 L  (6.3-8.2)  g/dL


 


Albumin   3.3 L  (3.5-5.0)  g/dL














Assessment and Plan


Assessment: 


1.  Small bowel obstruction versus ileus, improving


2.  History of CAD with no evidence of ACS


3.  1 episode of SVT, resolved


4.  History of peripheral vascular disease status post femorofemoral bypass


5.  End-stage renal disease, post transplant


6.  Hyperlipidemia





Plan: 


From cardiology's perspective medications were reviewed and we will continue the

same.  From our standpoint patient is cleared for discharge once cleared by 

primary and surgery.  He will follow-up as an outpatient with his primary 

cardiologist Dr. Helm. 





NP note has been reviewed, I agree with a documented findings and plan of care. 

Patient was seen and examined.

## 2024-09-29 NOTE — P.PN
Subjective


Progress Note Date: 09/29/24








This is a 59-year-old male patient who got transferred to the ICU because of 

hypotension.  The patient is admitted currently for small bowel obstruction.  

The patient is known to have end-stage renal disease on hemodialysis and his 

last hemodialysis session was yesterday.  He gets dialyzed MWF.  He has had 

previous kidney transplantation and has essentially failed.  He has had previous

surgeries in his abdomen for transplantation.  He presented to the hospital 

because of abdominal pain, cramping, nausea and emesis and his last bowel 

movement was around 4 days ago.  CAT scan of the abdomen showed dilated small 

bowel consistent with ileus/small bowel obstruction.  General surgery is on the 

case.  Because of ongoing discomfort, NG tube was inserted and immediately 

approximately 2 L of gastric material was aspirated.  Subsequently patient 

became hypotensive.  He starts cardiac blood pressure is was in the mid 70s.  He

got transferred to the ICU.  Given half a liter of normal saline and the current

blood pressure is 113/61.  He is awake and alert.  He is currently on room air 

oxygen with a pulse ox of 99%.  No significant tachycardia.  No reported fever. 

The white cell count is at 6.2 with a hemoglobin of 11.5 and a platelet count of

166.  BUN 65 with a creatinine of 9.7 and the patient's potassium level is at 

5.8 and a sodium level of 136.  Amylase and lipase are within normal limits.  

LFTs are within normal limits.  CAT scan of the abdomen also showed a loculated 

pleural effusion in the left lung base above the left hemidiaphragm with a 

calcified rim which is a sequelae of previous pneumonia/parapneumonic effusion. 

He has another calcified rim collection in the mid pelvis.  Denies having any 

cough or sputum production.  No significant respiratory distress.











(Done 4 hours during the 9/26/2024, the patient is being seen for a follow-up.  

NG tube is still in place and the patient denies having any significant nausea 

or vomiting and no significant abdominal pains on today's evaluation.  The 

patient has produced approximately 3.3 L of gastric output since insertion of 

the NG tube.  Currently, IV fluids are at KVO.  The patient underwent 

hemodialysis yesterday with ultrafiltration of 500 cc of fluids.  No fever.  No 

chills.  No tachycardia.  He did encounter a run of SVT yesterday and based on 

that, the patient was started on a Cardizem drip which is running at 10 mg an 

hour.  Currently, he is in normal sinus rhythm.  Cardiology has been consulted. 

The white cell count is at 5 with a hemoglobin 11.3 and a platelet count of 153.

 Sodium is at 136, potassium is at 5.3, BUN is 49 with a creatinine of 7.9 and 

the patient serum bicarb is at 23.  LFTs are all within normal limits.  Amylase 

and lipase are also within normal limits.  A follow-up small bowel x-ray that 

was done today showed limited examination with no contrast seen in the colon.  

Bowel obstruction is likely still present.  The patient has no altered 

mentation.  Communicating.  Denies having any specific complaints.Echocardiogram

was completed yesterday and the patient was found to have LV ejection fraction 

of 45 to 50%.  There was also mild increase in the septal wall thickness.  

Increase in the left ventricular volume systolic.  No significant valvular 

abnormalities other than some mild degree of aortic stenosis.





On 9/27/2024, the patient is being seen for a follow-up.  The patient is doing 

well with no specific complaints.  No nausea vomiting or abdominal pain.  The 

patient is passing flatus.  NG tube has been clamped.  The patient is also 

taking some popsicles and ice chips.  Otherwise, hemodynamically stable.  No 

angina or palpitation.  No shortness of breath.  No chest pain.  The white cell 

count is at 4.8 with hemoglobin of 11.3 and a platelet count of 173.  BUN 63 

with a creatinine of 9.7 and the patient's potassium level is at 5.7.  The 

patient is going to undergo another session of hemodialysis today.  No other 

significant events overnight.  Respiratory status is stable and the patient is 

currently on room air oxygen.  No other significant events overnight.  First t

festus of the abdomen was done today and it showed an unremarkable abdomen.  

Aneurysmal dilatation of the right common iliac artery was noted.





On 9/28/2024, seen the patient for a follow-up.  Patient is able to tolerate 

clear liquid diet.  Denies having any nausea or vomiting or chest pain or 

abdominal pain.  He has a history of renal disease and is currently on 

hemodialysis.  On today's blood work, sodium is at 132, BUN 37 with a creatinine

of 6.3.  White cell count is at 3 with a heme of 7.3 and a platelet count of 

166.  He is afebrile.  He is hemodynamically stable.  He is ambulating.  Using 

the incentive spirometer.  No other significant events overnight.  The patient 

got transferred out of the intensive care unit today.





On 9/29/2024, the patient is being seen for a follow-up.  NG tube has been 

removed.  He did encounter some abdominal discomfort earlier this morning.  I 

think he was quite aggressive in his food intake.  I recommended him going back 

into clear liquid or full liquid diet for now.  The patient has no emesis for 

now.  WBC count is at 3.2, he was 10.5 and a platelet count of 166.  BUN is 48 

with a creatinine of 8.5 and his sodium levels at 134.  Nephrology on the case. 

The patient has end-stage renal disease and the patient has been maintained on 

hemodialysis MW and his last hemodialysis session was on Friday and his 

upcoming hemodialysis session will be on Monday.





Objective





- Vital Signs


Vital signs: 


                                   Vital Signs











Temp  97.9 F   09/29/24 08:00


 


Pulse  66   09/29/24 08:36


 


Resp  18   09/29/24 08:00


 


BP  127/53   09/29/24 08:00


 


Pulse Ox  97   09/29/24 08:00


 


FiO2      








                                 Intake & Output











 09/28/24 09/29/24 09/29/24





 18:59 06:59 18:59


 


Intake Total 40 40 20


 


Balance 40 40 20


 


Weight  80.6 kg 


 


Intake:   


 


  IV 40 40 20


 


    Invasive Line 1 20 20 10


 


    Invasive Line 3 20 20 10


 


Other:   


 


  Voiding Method Toilet Toilet 





 Urinal Urinal 


 


  # Voids  1 














- Exam











- Constitutional


General appearance: no acute distress, NG tube removed





- EENT


Eyes: EOMI


Ears: negative: bulging, bullous, dull, erythema, fluid, myringotomy tube, obst

ructed by cerumen, scarring, unable to vistualize, other





- Neck


Neck: no lymphadenopathy


Carotids: negative: upstroke normal, upstroke delayed, upstroke diminished, 

upstroke bounding, bruit absent, bruit present


Thyroid: negative: normal size, enlarged, firm, nodule





- Respiratory


Respiratory: negative: CTA, diminished, dullness, rales, rhonchi, wheezing, 

prolonged expiration, prolonged inspiration, other





- Cardiovascular


Rhythm: regular


Heart sounds: abnormal: S1, S2





- Gastrointestinal


General gastrointestinal: Positive bowel sounds are present, abdomen is Soft, 

well-healed surgical scarring, nondistended, no rebound or guarding, tenderness 

to palpation in bilateral lower quadrants


 








- Integumentary


Integumentary: normal, normal turgor





- Neurologic


Neurologic: CNII-XII intact





- Musculoskeletal


Musculoskeletal: gait normal





- Psychiatric


Psychiatric: A&O x's 3 








- Labs


CBC & Chem 7: 


                                 09/29/24 08:03





                                 09/29/24 08:03


Labs: 


                  Abnormal Lab Results - Last 24 Hours (Table)











  09/29/24 09/29/24 Range/Units





  08:03 08:03 


 


WBC  3.2 L   (3.8-10.6)  k/uL


 


RBC  3.31 L   (4.30-5.90)  m/uL


 


Hgb  10.5 L   (13.0-17.5)  gm/dL


 


Hct  30.4 L   (39.0-53.0)  %


 


Lymphocytes #  0.5 L   (1.0-4.8)  k/uL


 


Sodium   134 L  (137-145)  mmol/L


 


Chloride   92 L  ()  mmol/L


 


BUN   48 H  (9-20)  mg/dL


 


Creatinine   8.52 H*  (0.66-1.25)  mg/dL


 


Glucose   114 H  (74-99)  mg/dL


 


AST   15 L  (17-59)  U/L


 


Total Protein   5.8 L  (6.3-8.2)  g/dL


 


Albumin   3.3 L  (3.5-5.0)  g/dL














Assessment and Plan


Plan: 








Small bowel obstruction/ileus, recovered and the NG tube has been removed and 

the patient is tolerating diet.  Positive bowel sounds.  No significant 

abdominal distention.  Limited abdominal discomfort this morning.  Abdomen 

remains quite decompressed.





Hypovolemic shock with secondary hypotension, responded to IV fluids.  The 

patient is currently normotensive





End-stage renal disease on hemodialysis 3 times a week, MWF, underwent a short 

session of hemodialysis on 9/25/2024 with a ultrafiltration of 500 cc.





Mild hyperkalemia related to end-stage renal disease





History of kidney transplantation x 2





History of hepatitis B





Hypertension, history of





Hyperlipidemia





COPD





Chronic pleural fluid and mid abdominal fluid collection with a calcified rim.  

The first is most likely a sequela of a previous pneumonia and parapneumonic 

effusion.  The second is most likely related to previous abdominal surgeries.  

Those are benign findings SVT, currently back into normal sinus rhythm and the 

patient is currently on Cardizem drip at 10 mg an hour.





CHF with mild impairment of LV function and mild aortic stenosis.





Plan





Advance diet gradually as tolerated.


NG tube has been removed


Hemodialysis to be done on Monday


Nephrology is on the case


Will continue to follow


The patient will be transferred out of the intensive care unit 


Encourage mobility


Room air oxygen

## 2024-09-29 NOTE — P.PN
Subjective





Patient is seen in follow-up for end-stage renal disease.  He is maintained on 

hemodialysis on Monday Wednesday Friday schedule.  Tolerating regular diet.  No 

active complaints.





Vital signs are stable.


General: No acute distress.


HEENT: On room air.


LUNGS: No audible rhonchi or wheezes.


HEART: Regular rate and rhythm.


ABDOMEN: Nontender.


EXTREMITITES: No edema.





Objective





- Vital Signs


Vital signs: 


                                   Vital Signs











Temp  97.9 F   09/29/24 08:00


 


Pulse  66   09/29/24 08:36


 


Resp  18   09/29/24 08:00


 


BP  127/53   09/29/24 08:00


 


Pulse Ox  97   09/29/24 08:00


 


FiO2      








                                 Intake & Output











 09/28/24 09/29/24 09/29/24





 18:59 06:59 18:59


 


Intake Total 40 40 20


 


Balance 40 40 20


 


Weight  80.6 kg 


 


Intake:   


 


  IV 40 40 20


 


    Invasive Line 1 20 20 10


 


    Invasive Line 3 20 20 10


 


Other:   


 


  Voiding Method Toilet Toilet 





 Urinal Urinal 


 


  # Voids  1 














- Labs


CBC & Chem 7: 


                                 09/29/24 08:03





                                 09/29/24 08:03


Labs: 


                  Abnormal Lab Results - Last 24 Hours (Table)











  09/29/24 09/29/24 Range/Units





  08:03 08:03 


 


WBC  3.2 L   (3.8-10.6)  k/uL


 


RBC  3.31 L   (4.30-5.90)  m/uL


 


Hgb  10.5 L   (13.0-17.5)  gm/dL


 


Hct  30.4 L   (39.0-53.0)  %


 


Lymphocytes #  0.5 L   (1.0-4.8)  k/uL


 


Sodium   134 L  (137-145)  mmol/L


 


Chloride   92 L  ()  mmol/L


 


BUN   48 H  (9-20)  mg/dL


 


Creatinine   8.52 H*  (0.66-1.25)  mg/dL


 


Glucose   114 H  (74-99)  mg/dL


 


AST   15 L  (17-59)  U/L


 


Total Protein   5.8 L  (6.3-8.2)  g/dL


 


Albumin   3.3 L  (3.5-5.0)  g/dL














Assessment and Plan


Plan: 





Assessment:


1.  End-stage renal disease maintained on hemodialysis on Monday Wednesday Friday schedule.


2.  Abdominal pain due to ileus versus bowel obstruction.  Surgery following.  

NG tube removed.  Now on regular diet.


3.  Coronary artery disease with cardiac stenting.


4.  Peripheral vascular disease status post surgical invention in the past.  

Patient follows with vascular surgery from [facility].


5.  Chronic kidney disease mineral bone disease maintained on Sensipar and 

PhosLo outpatient.


6.  History of hepatitis B maintained on tenofovir.


7.  Hyperkalemia secondary to chronic kidney disease.  Improved postdialysis.


8.  SVTs status post Cardizem drip.  Heart rate controlled.





Plan:


Hemodialysis Monday.


Check phosphorus level.

## 2024-10-01 NOTE — P.DS
Providers


Date of admission: 


09/24/24 18:04





Attending physician: 


Ramez Barnett MD





Consults: 





                                        





09/24/24 18:21


Consult Physician Routine 


   Consulting Provider: Liam Delatorre


   Consult Reason/Comments: ESRD on hemodialysis


   Do you want consulting provider notified?: Yes, Notify in am


Consult Physician Routine 


   Consulting Provider: Maurisio Coon


   Consult Reason/Comments: SBO vs ileus, abdominal pain


   Do you want consulting provider notified?: Yes, Notify in am





09/25/24 15:15


Consult Physician Stat 


   Consulting Provider: Arturo Mays


   Consult Reason/Comments: hypotension, need ICU monitoring, poss pressor, sbo


   Do you want consulting provider notified?: Yes





09/25/24 22:25


Consult Physician Stat 


   Consulting Provider: Mauricio Bay


   Consult Reason/Comments: SVT


   Do you want consulting provider notified?: Already Contacted











Primary care physician: 


Sophia Dalal





[facility] Course: 


Final Diagnosis


Small bowel obstruction vs. Ileus resolved with conservative management.


End stage renal disease maintained on hemodialysis 


Episode of SVT


Hyperkalemia 


Chronic pleural effusion 


Hx of left kidney transplant


Hepatitis B


COPD with no acute exacerbation


Gastroesophageal reflux disease


Hypertension


Hyperlipidemia 


Former Smoker 





Discharge Disposition


Patient is stable for discharge home. Patient to follow up with general surgery 

in 1 week. Patient to continue usual hemodialysis session MWF. He will see his 

PCP Dr. Dalal in 1 to 2 days. Repeat BMP magnesium monday hemodialysis.





Hospital Course


This is a 59-year-old male patient with medical history of end-stage renal 

disease on hemodialysis and his last hemodialysis session was yesterday.  He 

gets dialyzed MWF.  He has had previous kidney transplantation and has 

essentially failed.  He has had previous surgeries in his abdomen for 

transplantation.  He presented to the hospital because of abdominal pain, 

cramping, nausea and emesis and his last bowel movement was around 4 days ago.  

CAT scan of the abdomen showed dilated small bowel consistent with ileus/small 

bowel obstruction. The patient is admitted currently for small bowel 

obstruction. General surgery was consulted. Patient was moved to the ICU due to 

episode of hypotension requiring fluid bolus and close monitoring. He is 

undergoing hemodialysis. NG tube was placed for decompression and conservative 

management o the bowel obstruction. Blood pressure improved he was moved out of 

the ICU. Diet advanced, NG tube was removed. He is up ambulating without 

difficulty. No chest pain, no shortness of breath and essentially no abdominal 

pain. He has been cleared for discharge. He will continue same hemodialysis MWF 

on discharge. 





Please see medication reconciliation for a list of current medications. Thank 

you for allowing us to participate in the care of this patient. 





The impression and plan of care has been dictated by Oumou Muhammad, Nurse 

Practitioner as directed.





Dr. Jessica MD


I have performed a history and physical examination and medical decision making 

of this patient, discussed the same with the dictator, and agree with the 

dictators assessment and plan as written, documented as a scribe. Based on total

visit time, I have performed more than 50% of this visit.





Patient Condition at Discharge: Stable





Plan - Discharge Summary


Discharge Rx Participant: No


New Discharge Prescriptions: 


New


   Metoprolol Tartrate [Lopressor] 25 mg PO BID #60 tab


   Midodrine [ProAmatine] 10 mg PO AC-TID #90 tab





Continue


   Omeprazole 20 mg PO DAILY


   Aspirin 81 mg PO DAILY


   Cinacalcet HCl [Sensipar] 60 mg PO W/SUPPER


   Ondansetron Odt [Zofran ODT] 4 mg PO TID PRN


     PRN Reason: Nausea


   Vit B Comp No.3/Folic/C/Biotin [Bhumika-Rene Rx Tablet] 1 tab PO DAILY


   Calcium Acetate 668mg 2,004 mg PO TID-W/MEALS


   clonazePAM [KlonoPIN ODT] 0.25 mg PO HS PRN


     PRN Reason: Anxiety


   Pravastatin Sodium [Pravachol] 40 mg PO HS


   Tiotropium 2.5 Mcg/Puff [Spiriva Respimat 2.5 Mcg] 2 puff INHALATION RT-DAILY


   Albuterol Nebulized [Ventolin Nebulized] 2.5 mg INHALATION RT-QID


   Albuterol Inhaler [Ventolin Hfa Inhaler] 2 puff INHALATION RT-Q4H PRN


     PRN Reason: Shortness Of Breath


   Tenofovir Disoproxil Fumarate [Viread] 300 mg PO FR


Discharge Medication List





Aspirin 81 mg PO DAILY 04/24/17 [History]


Omeprazole 20 mg PO DAILY 04/24/17 [History]


Albuterol Inhaler [Ventolin Hfa Inhaler] 2 puff INHALATION RT-Q4H PRN 09/24/24 

[History]


Albuterol Nebulized [Ventolin Nebulized] 2.5 mg INHALATION RT-QID 09/24/24 

[History]


Calcium Acetate 668mg 2,004 mg PO TID-W/MEALS 09/24/24 [History]


Cinacalcet HCl [Sensipar] 60 mg PO W/SUPPER 09/24/24 [History]


Ondansetron Odt [Zofran ODT] 4 mg PO TID PRN 09/24/24 [History]


Pravastatin Sodium [Pravachol] 40 mg PO HS 09/24/24 [History]


Tenofovir Disoproxil Fumarate [Viread] 300 mg PO FR 09/24/24 [History]


Tiotropium 2.5 Mcg/Puff [Spiriva Respimat 2.5 Mcg] 2 puff INHALATION RT-DAILY 

09/24/24 [History]


Vit B Comp No.3/Folic/C/Biotin [Bhumika-Rene Rx Tablet] 1 tab PO DAILY 09/24/24 

[History]


clonazePAM [KlonoPIN ODT] 0.25 mg PO HS PRN 09/24/24 [History]


Metoprolol Tartrate [Lopressor] 25 mg PO BID #60 tab 09/29/24 [Rx]


Midodrine [ProAmatine] 10 mg PO AC-TID #90 tab 09/29/24 [Rx]








Follow up Appointment(s)/Referral(s): 


Holly Jiménez MD [STAFF PHYSICIAN] - 1 Week (Follow up with nephrology and 

continue dialysis as you are typically scheduled.)


Aki Heredia DO [Doctor of Osteopathic Medicine] - 1 Week (Please follow up 

with Dr. Heredia, the surgeon, in about a week. )


Sophia Dalal [Primary Care Provider] - 1-2 days (Please schedule a 

hospital follow-up with your primary care provider )


Ambulatory/Diagnostic Orders: 


Basic Metabolic Panel [LAB.AMB] Time Frame: 3 Days, Location: None Selected


Magnesium [LAB.AMB] Location: None Selected


Patient Instructions/Handouts:  Bowel Obstruction (DC)


Activity/Diet/Wound Care/Special Instructions: 


Diet as tolerated


Continue same hemodialysis schedule MWF


Discharge Disposition: HOME SELF-CARE

## 2024-10-14 NOTE — CDI
Documentation Clarification Form



Date: 10/14/2024 07:44:46 PM

From: Sahra Bhakta

Phone: 

MRN: [id number]

Admit Date: 09/24/2024 06:04:00 PM

Patient Name: Rad Atkinson

Visit Number: [id number]

Discharge Date:  09/29/2024 04:22:00 PM





ATTENTION: The Clinical Documentation Specialists (CDI) and [facility] Coding Staff 
appreciate your assistance in clarifying documentation. Please respond to the 
clarification below the line at the bottom and electronically sign. The CDI & 
[facility] Coding staff will review the response and follow-up if needed. Please note: 
Queries are made part of the Legal Health Record. If you have any questions, 
please contact the author of this message via ITS.



Doctor/Provider: Liam Delatorre



There is documentation of chronic kidney disease mineral bone disease per 
Nephrology Consult Note and Progress Notes.  Additional clarification is 
requested.



History/Risk Factors: 58yo M, failed kidney transplantESRD on HD, HTN, HLD, 
ileusversusbowel obstruction, CAD, NIDDMII w PVD, CKD mineral bone disease, 
hepatitis B, hyperkalemia, 



Clinical Indicators: 

eGFR: 7-9

BUN 37 

Creatinine 7.16 



Treatment: maintained on Sensipar and PhosLo outpatient



Can you please clarify chronic kidney disease mineral bone disease?



[  ] Renal Osteodystrophy

[  x] Chronic kidney disease mineral bone disease, NOS

[  ] Other, please specify ________

[  ] Unable to determine





(Template Last Revised: March 2021)

___________________________________________________________________________

MTDD